# Patient Record
Sex: MALE | Race: BLACK OR AFRICAN AMERICAN | NOT HISPANIC OR LATINO | Employment: FULL TIME | URBAN - METROPOLITAN AREA
[De-identification: names, ages, dates, MRNs, and addresses within clinical notes are randomized per-mention and may not be internally consistent; named-entity substitution may affect disease eponyms.]

---

## 2022-07-18 ENCOUNTER — HOSPITAL ENCOUNTER (EMERGENCY)
Facility: HOSPITAL | Age: 42
Discharge: HOME | End: 2022-07-18
Attending: STUDENT IN AN ORGANIZED HEALTH CARE EDUCATION/TRAINING PROGRAM
Payer: COMMERCIAL

## 2022-07-18 VITALS
HEIGHT: 75 IN | RESPIRATION RATE: 16 BRPM | HEART RATE: 69 BPM | DIASTOLIC BLOOD PRESSURE: 89 MMHG | SYSTOLIC BLOOD PRESSURE: 149 MMHG | TEMPERATURE: 97 F | WEIGHT: 255.31 LBS | BODY MASS INDEX: 31.75 KG/M2 | OXYGEN SATURATION: 100 %

## 2022-07-18 DIAGNOSIS — R07.9 CHEST PAIN: ICD-10-CM

## 2022-07-18 DIAGNOSIS — R07.89 ATYPICAL CHEST PAIN: Primary | ICD-10-CM

## 2022-07-18 DIAGNOSIS — T14.8XXA MUSCLE STRAIN: ICD-10-CM

## 2022-07-18 LAB
ALBUMIN SERPL-MCNC: 4.1 GM/DL (ref 3.5–5)
ALBUMIN/GLOB SERPL: 1.2 RATIO (ref 1.1–2)
ALP SERPL-CCNC: 69 UNIT/L (ref 40–150)
ALT SERPL-CCNC: 41 UNIT/L (ref 0–55)
AST SERPL-CCNC: 31 UNIT/L (ref 5–34)
BASOPHILS # BLD AUTO: 0.03 X10(3)/MCL (ref 0–0.2)
BASOPHILS NFR BLD AUTO: 0.4 %
BILIRUBIN DIRECT+TOT PNL SERPL-MCNC: 0.4 MG/DL
BUN SERPL-MCNC: 18.2 MG/DL (ref 8.9–20.6)
CALCIUM SERPL-MCNC: 9.9 MG/DL (ref 8.4–10.2)
CHLORIDE SERPL-SCNC: 101 MMOL/L (ref 98–107)
CO2 SERPL-SCNC: 28 MMOL/L (ref 22–29)
CREAT SERPL-MCNC: 1.24 MG/DL (ref 0.73–1.18)
EOSINOPHIL # BLD AUTO: 0.17 X10(3)/MCL (ref 0–0.9)
EOSINOPHIL NFR BLD AUTO: 2.5 %
ERYTHROCYTE [DISTWIDTH] IN BLOOD BY AUTOMATED COUNT: 13.8 % (ref 11.5–17)
GLOBULIN SER-MCNC: 3.4 GM/DL (ref 2.4–3.5)
GLUCOSE SERPL-MCNC: 108 MG/DL (ref 74–100)
HCT VFR BLD AUTO: 46.3 % (ref 42–52)
HGB BLD-MCNC: 14.7 GM/DL (ref 14–18)
IMM GRANULOCYTES # BLD AUTO: 0.02 X10(3)/MCL (ref 0–0.04)
IMM GRANULOCYTES NFR BLD AUTO: 0.3 %
LYMPHOCYTES # BLD AUTO: 2.42 X10(3)/MCL (ref 0.6–4.6)
LYMPHOCYTES NFR BLD AUTO: 34.9 %
MCH RBC QN AUTO: 29.1 PG (ref 27–31)
MCHC RBC AUTO-ENTMCNC: 31.7 MG/DL (ref 33–36)
MCV RBC AUTO: 91.5 FL (ref 80–94)
MONOCYTES # BLD AUTO: 0.73 X10(3)/MCL (ref 0.1–1.3)
MONOCYTES NFR BLD AUTO: 10.5 %
NEUTROPHILS # BLD AUTO: 3.6 X10(3)/MCL (ref 2.1–9.2)
NEUTROPHILS NFR BLD AUTO: 51.4 %
NRBC BLD AUTO-RTO: 0 %
PLATELET # BLD AUTO: 244 X10(3)/MCL (ref 130–400)
PMV BLD AUTO: 9.1 FL (ref 7.4–10.4)
POTASSIUM SERPL-SCNC: 3.8 MMOL/L (ref 3.5–5.1)
PROT SERPL-MCNC: 7.5 GM/DL (ref 6.4–8.3)
RBC # BLD AUTO: 5.06 X10(6)/MCL (ref 4.7–6.1)
SODIUM SERPL-SCNC: 138 MMOL/L (ref 136–145)
TROPONIN I SERPL-MCNC: <0.01 NG/ML (ref 0–0.04)
WBC # SPEC AUTO: 6.9 X10(3)/MCL (ref 4.5–11.5)

## 2022-07-18 PROCEDURE — 84484 ASSAY OF TROPONIN QUANT: CPT | Performed by: PHYSICIAN ASSISTANT

## 2022-07-18 PROCEDURE — 99285 EMERGENCY DEPT VISIT HI MDM: CPT | Mod: 25

## 2022-07-18 PROCEDURE — 96372 THER/PROPH/DIAG INJ SC/IM: CPT | Performed by: PHYSICIAN ASSISTANT

## 2022-07-18 PROCEDURE — 80053 COMPREHEN METABOLIC PANEL: CPT | Performed by: PHYSICIAN ASSISTANT

## 2022-07-18 PROCEDURE — 36415 COLL VENOUS BLD VENIPUNCTURE: CPT | Performed by: PHYSICIAN ASSISTANT

## 2022-07-18 PROCEDURE — 85025 COMPLETE CBC W/AUTO DIFF WBC: CPT | Performed by: PHYSICIAN ASSISTANT

## 2022-07-18 PROCEDURE — 93005 ELECTROCARDIOGRAM TRACING: CPT

## 2022-07-18 PROCEDURE — 63600175 PHARM REV CODE 636 W HCPCS: Performed by: PHYSICIAN ASSISTANT

## 2022-07-18 RX ORDER — DICLOFENAC SODIUM 75 MG/1
75 TABLET, DELAYED RELEASE ORAL 2 TIMES DAILY PRN
Qty: 30 TABLET | Refills: 0 | Status: SHIPPED | OUTPATIENT
Start: 2022-07-18 | End: 2022-07-18 | Stop reason: SDUPTHER

## 2022-07-18 RX ORDER — DICLOFENAC SODIUM 75 MG/1
75 TABLET, DELAYED RELEASE ORAL 2 TIMES DAILY PRN
Qty: 30 TABLET | Refills: 0 | Status: SHIPPED | OUTPATIENT
Start: 2022-07-18

## 2022-07-18 RX ORDER — KETOROLAC TROMETHAMINE 30 MG/ML
30 INJECTION, SOLUTION INTRAMUSCULAR; INTRAVENOUS
Status: COMPLETED | OUTPATIENT
Start: 2022-07-18 | End: 2022-07-18

## 2022-07-18 RX ORDER — METHOCARBAMOL 500 MG/1
1000 TABLET, FILM COATED ORAL 3 TIMES DAILY
Qty: 30 TABLET | Refills: 0 | Status: SHIPPED | OUTPATIENT
Start: 2022-07-18 | End: 2022-07-23

## 2022-07-18 RX ORDER — METHOCARBAMOL 500 MG/1
1000 TABLET, FILM COATED ORAL 3 TIMES DAILY
Qty: 30 TABLET | Refills: 0 | Status: SHIPPED | OUTPATIENT
Start: 2022-07-18 | End: 2022-07-18 | Stop reason: SDUPTHER

## 2022-07-18 RX ADMIN — KETOROLAC TROMETHAMINE 30 MG: 30 INJECTION, SOLUTION INTRAMUSCULAR; INTRAVENOUS at 09:07

## 2022-07-19 NOTE — ED PROVIDER NOTES
Encounter Date: 7/18/2022       History     Chief Complaint   Patient presents with    Chest Pain     Pt to ed c/o chest pain radiating to left arm and back that began last night. Pt denies any cardiac history. Reports hx of anxiety but recently stopped taking the medication     Patient is a 41 year old male who presents to the ED with complaints of central chest pain radiating to left arm, back pain x 1 day that began while sleeping last night. He does endorse chest pain when pressing on chest.   He denies cardiac hx.  He does report having anxiety but he stopped taking the medication.  He denies SOB, vision changes, dizziness, abdominal pain, nausea, vomiting.      The history is provided by the patient. No  was used.     Review of patient's allergies indicates:  No Known Allergies  History reviewed. No pertinent past medical history.  No past surgical history on file.  No family history on file.  Social History     Tobacco Use    Smoking status: Current Every Day Smoker     Packs/day: 0.50     Types: Cigarettes    Smokeless tobacco: Never Used   Substance Use Topics    Alcohol use: Never    Drug use: Never     Review of Systems   Constitutional: Negative for appetite change, chills and fever.   HENT: Negative.    Respiratory: Negative for cough and shortness of breath.    Cardiovascular: Positive for chest pain. Negative for palpitations.   Gastrointestinal: Negative for abdominal pain, nausea and vomiting.   Genitourinary: Negative.    Musculoskeletal: Positive for back pain. Negative for neck pain.        Left arm pain   Skin: Negative.    Neurological: Negative for dizziness, light-headedness and headaches.   Hematological: Negative.        Physical Exam     Initial Vitals [07/18/22 1719]   BP Pulse Resp Temp SpO2   (!) 149/89 69 16 97 °F (36.1 °C) 100 %      MAP       --         Physical Exam    Nursing note and vitals reviewed.  Constitutional: He appears well-developed and  well-nourished.   HENT:   Nose: Nose normal.   Mouth/Throat: Oropharynx is clear and moist.   Eyes: Conjunctivae are normal. Pupils are equal, round, and reactive to light.   Neck:   Normal range of motion.  Cardiovascular: Normal rate, regular rhythm, normal heart sounds and intact distal pulses.   Pulmonary/Chest: Breath sounds normal. He has no wheezes.   Abdominal: Abdomen is soft. Bowel sounds are normal. There is no abdominal tenderness.   Musculoskeletal:         General: Normal range of motion.      Cervical back: Normal range of motion. No bony tenderness.      Thoracic back: No bony tenderness.      Lumbar back: No bony tenderness.     Neurological: He is alert. He has normal strength. GCS score is 15. GCS eye subscore is 4. GCS verbal subscore is 5. GCS motor subscore is 6.   Skin: Capillary refill takes less than 2 seconds.         ED Course   Procedures  Labs Reviewed   COMPREHENSIVE METABOLIC PANEL - Abnormal; Notable for the following components:       Result Value    Glucose Level 108 (*)     Creatinine 1.24 (*)     All other components within normal limits   CBC WITH DIFFERENTIAL - Abnormal; Notable for the following components:    MCHC 31.7 (*)     All other components within normal limits   TROPONIN I - Normal   CBC W/ AUTO DIFFERENTIAL    Narrative:     The following orders were created for panel order CBC Auto Differential.  Procedure                               Abnormality         Status                     ---------                               -----------         ------                     CBC with Differential[[id number]]        Abnormal            Final result                 Please view results for these tests on the individual orders.        ECG Results          EKG 12-lead (Chest Pain) Age >30 (In process)  Result time 07/18/22 18:29:08    In process by Interface, Lab In [facility] (07/18/22 18:29:08)                 Narrative:    Test Reason : R07.9,    Vent. Rate : 065 BPM     Atrial  Rate : 065 BPM     P-R Int : 160 ms          QRS Dur : 090 ms      QT Int : 410 ms       P-R-T Axes : 069 004 001 degrees     QTc Int : 426 ms    Normal sinus rhythm  Minimal voltage criteria for LVH, may be normal variant  Borderline Abnormal ECG  No previous ECGs available    Referred By: [email or web address]   SELF           Confirmed By:                             Imaging Results          X-Ray Chest PA And Lateral (Final result)  Result time 07/19/22 07:24:29    Final result by Sanjeev Costa MD (07/19/22 07:24:29)                 Impression:      No acute cardiopulmonary process.      Electronically signed by: Sanjeev Costa  Date:    07/19/2022  Time:    07:24             Narrative:    EXAMINATION:  XR CHEST PA AND LATERAL    CLINICAL HISTORY:  Chest pain, unspecified    TECHNIQUE:  Two views of the chest    COMPARISON:  04/24/2020    FINDINGS:  No focal opacification, pleural effusion, or pneumothorax.    The cardiomediastinal silhouette is within normal limits.    No acute osseous abnormality.                                 Medications   ketorolac injection 30 mg (30 mg Intramuscular Given 7/18/22 2116)     Medical Decision Making:   Clinical Tests:   Lab Tests: Ordered and Reviewed  Radiological Study: Ordered and Reviewed  Medical Tests: Ordered and Reviewed  ED Management:  The patient is resting comfortably and in no acute distress.  He states that his symptoms have improved after treatment in Emergency Department. I personally discussed his test results and treatment plan.  Gave strict ED precautions and specific conditions for return to the emergency department and importance of follow up with pcp.  Patient voices understanding and agrees to the plan discussed. All patients' questions have been answered at this time. He has remained hemodynamically stable throughout entire stay in ED and is stable for discharge home.             ED Course as of 07/19/22 1514   Tue Jul 19, 2022   1509 X-Ray Chest PA And  Lateral  No acute cardiopulmonary process. [ER]      ED Course User Index  [ER] ALEXA Carrillo             Clinical Impression:   Final diagnoses:  [R07.9] Chest pain  [R07.89] Atypical chest pain (Primary)  [T14.8XXA] Muscle strain          ED Disposition Condition    Discharge Stable        ED Prescriptions     Medication Sig Dispense Start Date End Date Auth. Provider    diclofenac (VOLTAREN) 75 MG EC tablet  (Status: Discontinued) Take 1 tablet (75 mg total) by mouth 2 (two) times daily as needed (pain). Do not take this with Advil, Ibuprofen, Motrin, Asprin, or Toradol. 30 tablet 7/18/2022 7/18/2022 ALEXA Carrillo    methocarbamoL (ROBAXIN) 500 MG Tab  (Status: Discontinued) Take 2 tablets (1,000 mg total) by mouth 3 (three) times daily. for 5 days 30 tablet 7/18/2022 7/18/2022 ALEXA Carrillo    diclofenac (VOLTAREN) 75 MG EC tablet  (Status: Discontinued) Take 1 tablet (75 mg total) by mouth 2 (two) times daily as needed (pain). Do not take this with Advil, Ibuprofen, Motrin, Asprin, or Toradol. 30 tablet 7/18/2022 7/18/2022 ALEXA Carrillo    methocarbamoL (ROBAXIN) 500 MG Tab Take 2 tablets (1,000 mg total) by mouth 3 (three) times daily. for 5 days 30 tablet 7/18/2022 7/23/2022 ALEXA Carrillo    diclofenac (VOLTAREN) 75 MG EC tablet Take 1 tablet (75 mg total) by mouth 2 (two) times daily as needed (pain). Do not take this with Advil, Ibuprofen, Motrin, Asprin, or Toradol. 30 tablet 7/18/2022  ALEXA Carrillo        Follow-up Information     Follow up With Specialties Details Why Contact Info Additional Information    [facility] - Emergency Dept Emergency Medicine  As needed, If symptoms worsen [address] [place] [id number] [phone]     [facility] - Internal Medicine Internal Medicine Call  Ask For Internal Medicine clinic.  Once connected to clinic, inform them you were referred to clinic to establish care with a pcp. [address] [place] [address]  [place] [address]  [phone] Internal Medicine Clinic Entrance #1           ALEXA Carrillo  07/19/22 2324

## 2024-07-10 ENCOUNTER — HOSPITAL ENCOUNTER (INPATIENT)
Facility: HOSPITAL | Age: 44
LOS: 6 days | Discharge: HOME | DRG: 310 | End: 2024-07-16
Attending: EMERGENCY MEDICINE | Admitting: INTERNAL MEDICINE

## 2024-07-10 DIAGNOSIS — I48.91 ATRIAL FIBRILLATION WITH RVR: ICD-10-CM

## 2024-07-10 DIAGNOSIS — I48.91 ATRIAL FIBRILLATION: ICD-10-CM

## 2024-07-10 DIAGNOSIS — R07.9 CHEST PAIN: ICD-10-CM

## 2024-07-10 DIAGNOSIS — I48.91 NEW ONSET ATRIAL FIBRILLATION: Primary | ICD-10-CM

## 2024-07-10 PROBLEM — F17.200 TOBACCO DEPENDENCY: Status: ACTIVE | Noted: 2024-07-10

## 2024-07-10 PROBLEM — E66.9 OBESITY: Status: ACTIVE | Noted: 2024-07-10

## 2024-07-10 LAB
ALBUMIN SERPL-MCNC: 3.8 G/DL (ref 3.5–5)
ALBUMIN/GLOB SERPL: 1.2 RATIO (ref 1.1–2)
ALP SERPL-CCNC: 61 UNIT/L (ref 40–150)
ALT SERPL-CCNC: 51 UNIT/L (ref 0–55)
ANION GAP SERPL CALC-SCNC: 6 MEQ/L
AST SERPL-CCNC: 26 UNIT/L (ref 5–34)
BASOPHILS # BLD AUTO: 0.03 X10(3)/MCL
BASOPHILS NFR BLD AUTO: 0.5 %
BILIRUB SERPL-MCNC: 0.5 MG/DL
BNP BLD-MCNC: 323.6 PG/ML
BUN SERPL-MCNC: 10.8 MG/DL (ref 8.9–20.6)
CALCIUM SERPL-MCNC: 9.9 MG/DL (ref 8.4–10.2)
CHLORIDE SERPL-SCNC: 107 MMOL/L (ref 98–107)
CO2 SERPL-SCNC: 26 MMOL/L (ref 22–29)
CREAT SERPL-MCNC: 1.11 MG/DL (ref 0.73–1.18)
CREAT/UREA NIT SERPL: 10
EOSINOPHIL # BLD AUTO: 0.09 X10(3)/MCL (ref 0–0.9)
EOSINOPHIL NFR BLD AUTO: 1.4 %
ERYTHROCYTE [DISTWIDTH] IN BLOOD BY AUTOMATED COUNT: 14.1 % (ref 11.5–17)
GFR SERPLBLD CREATININE-BSD FMLA CKD-EPI: >60 ML/MIN/1.73/M2
GLOBULIN SER-MCNC: 3.3 GM/DL (ref 2.4–3.5)
GLUCOSE SERPL-MCNC: 110 MG/DL (ref 74–100)
HCT VFR BLD AUTO: 48.5 % (ref 42–52)
HGB BLD-MCNC: 15.9 G/DL (ref 14–18)
HOLD SPECIMEN: NORMAL
HOLD SPECIMEN: NORMAL
IMM GRANULOCYTES # BLD AUTO: 0.03 X10(3)/MCL (ref 0–0.04)
IMM GRANULOCYTES NFR BLD AUTO: 0.5 %
LYMPHOCYTES # BLD AUTO: 2.22 X10(3)/MCL (ref 0.6–4.6)
LYMPHOCYTES NFR BLD AUTO: 35.2 %
MCH RBC QN AUTO: 30.6 PG (ref 27–31)
MCHC RBC AUTO-ENTMCNC: 32.8 G/DL (ref 33–36)
MCV RBC AUTO: 93.4 FL (ref 80–94)
MONOCYTES # BLD AUTO: 0.77 X10(3)/MCL (ref 0.1–1.3)
MONOCYTES NFR BLD AUTO: 12.2 %
NEUTROPHILS # BLD AUTO: 3.17 X10(3)/MCL (ref 2.1–9.2)
NEUTROPHILS NFR BLD AUTO: 50.2 %
NRBC BLD AUTO-RTO: 0 %
OHS QRS DURATION: 76 MS
OHS QRS DURATION: 86 MS
OHS QTC CALCULATION: 472 MS
OHS QTC CALCULATION: 478 MS
PLATELET # BLD AUTO: 278 X10(3)/MCL (ref 130–400)
PMV BLD AUTO: 9.2 FL (ref 7.4–10.4)
POTASSIUM SERPL-SCNC: 4.4 MMOL/L (ref 3.5–5.1)
PROT SERPL-MCNC: 7.1 GM/DL (ref 6.4–8.3)
RBC # BLD AUTO: 5.19 X10(6)/MCL (ref 4.7–6.1)
SODIUM SERPL-SCNC: 139 MMOL/L (ref 136–145)
TROPONIN I SERPL-MCNC: <0.01 NG/ML (ref 0–0.04)
TSH SERPL-ACNC: 1.23 UIU/ML (ref 0.35–4.94)
WBC # BLD AUTO: 6.31 X10(3)/MCL (ref 4.5–11.5)

## 2024-07-10 PROCEDURE — 93005 ELECTROCARDIOGRAM TRACING: CPT

## 2024-07-10 PROCEDURE — 99285 EMERGENCY DEPT VISIT HI MDM: CPT | Mod: 25

## 2024-07-10 PROCEDURE — 85025 COMPLETE CBC W/AUTO DIFF WBC: CPT | Performed by: PHYSICIAN ASSISTANT

## 2024-07-10 PROCEDURE — 25000003 PHARM REV CODE 250

## 2024-07-10 PROCEDURE — 83880 ASSAY OF NATRIURETIC PEPTIDE: CPT | Performed by: PHYSICIAN ASSISTANT

## 2024-07-10 PROCEDURE — 25000003 PHARM REV CODE 250: Performed by: PHYSICIAN ASSISTANT

## 2024-07-10 PROCEDURE — 80053 COMPREHEN METABOLIC PANEL: CPT | Performed by: PHYSICIAN ASSISTANT

## 2024-07-10 PROCEDURE — 63600175 PHARM REV CODE 636 W HCPCS

## 2024-07-10 PROCEDURE — 84443 ASSAY THYROID STIM HORMONE: CPT | Performed by: PHYSICIAN ASSISTANT

## 2024-07-10 PROCEDURE — 21400001 HC TELEMETRY ROOM

## 2024-07-10 PROCEDURE — 84484 ASSAY OF TROPONIN QUANT: CPT | Performed by: PHYSICIAN ASSISTANT

## 2024-07-10 PROCEDURE — 96374 THER/PROPH/DIAG INJ IV PUSH: CPT

## 2024-07-10 RX ORDER — SODIUM CHLORIDE 9 MG/ML
INJECTION, SOLUTION INTRAVENOUS ONCE
Status: COMPLETED | OUTPATIENT
Start: 2024-07-10 | End: 2024-07-10

## 2024-07-10 RX ORDER — NALOXONE HCL 0.4 MG/ML
0.02 VIAL (ML) INJECTION
Status: DISCONTINUED | OUTPATIENT
Start: 2024-07-10 | End: 2024-07-16 | Stop reason: HOSPADM

## 2024-07-10 RX ORDER — METOPROLOL TARTRATE 1 MG/ML
5 INJECTION, SOLUTION INTRAVENOUS
Status: COMPLETED | OUTPATIENT
Start: 2024-07-10 | End: 2024-07-10

## 2024-07-10 RX ORDER — GLUCAGON 1 MG
1 KIT INJECTION
Status: DISCONTINUED | OUTPATIENT
Start: 2024-07-10 | End: 2024-07-16 | Stop reason: HOSPADM

## 2024-07-10 RX ORDER — IBUPROFEN 200 MG
16 TABLET ORAL
Status: DISCONTINUED | OUTPATIENT
Start: 2024-07-10 | End: 2024-07-16 | Stop reason: HOSPADM

## 2024-07-10 RX ORDER — DILTIAZEM HYDROCHLORIDE 5 MG/ML
10 INJECTION INTRAVENOUS
Status: COMPLETED | OUTPATIENT
Start: 2024-07-10 | End: 2024-07-10

## 2024-07-10 RX ORDER — METOPROLOL TARTRATE 1 MG/ML
5 INJECTION, SOLUTION INTRAVENOUS EVERY 5 MIN PRN
Status: COMPLETED | OUTPATIENT
Start: 2024-07-10 | End: 2024-07-10

## 2024-07-10 RX ORDER — IBUPROFEN 200 MG
1 TABLET ORAL DAILY PRN
Status: DISCONTINUED | OUTPATIENT
Start: 2024-07-10 | End: 2024-07-16 | Stop reason: HOSPADM

## 2024-07-10 RX ORDER — SODIUM CHLORIDE 0.9 % (FLUSH) 0.9 %
10 SYRINGE (ML) INJECTION EVERY 12 HOURS PRN
Status: DISCONTINUED | OUTPATIENT
Start: 2024-07-10 | End: 2024-07-16 | Stop reason: HOSPADM

## 2024-07-10 RX ORDER — DILTIAZEM HYDROCHLORIDE 5 MG/ML
20 INJECTION INTRAVENOUS
Status: COMPLETED | OUTPATIENT
Start: 2024-07-10 | End: 2024-07-10

## 2024-07-10 RX ORDER — HEPARIN SODIUM 5000 [USP'U]/ML
5000 INJECTION, SOLUTION INTRAVENOUS; SUBCUTANEOUS EVERY 8 HOURS
Status: DISCONTINUED | OUTPATIENT
Start: 2024-07-10 | End: 2024-07-11

## 2024-07-10 RX ORDER — IBUPROFEN 200 MG
24 TABLET ORAL
Status: DISCONTINUED | OUTPATIENT
Start: 2024-07-10 | End: 2024-07-16 | Stop reason: HOSPADM

## 2024-07-10 RX ADMIN — DILTIAZEM HYDROCHLORIDE 20 MG: 5 INJECTION INTRAVENOUS at 11:07

## 2024-07-10 RX ADMIN — METOPROLOL TARTRATE 5 MG: 1 INJECTION, SOLUTION INTRAVENOUS at 03:07

## 2024-07-10 RX ADMIN — METOPROLOL TARTRATE 5 MG: 1 INJECTION, SOLUTION INTRAVENOUS at 08:07

## 2024-07-10 RX ADMIN — DILTIAZEM HYDROCHLORIDE 10 MG: 5 INJECTION INTRAVENOUS at 11:07

## 2024-07-10 RX ADMIN — SODIUM CHLORIDE: 9 INJECTION, SOLUTION INTRAVENOUS at 10:07

## 2024-07-10 RX ADMIN — METOPROLOL TARTRATE 5 MG: 1 INJECTION, SOLUTION INTRAVENOUS at 07:07

## 2024-07-10 RX ADMIN — HEPARIN SODIUM 5000 UNITS: 5000 INJECTION, SOLUTION INTRAVENOUS; SUBCUTANEOUS at 08:07

## 2024-07-10 NOTE — LETTER
July 16, 2024         [address] [facility] [address]  Phone: [phone]  Fax: [phone]       Patient: Thad Payne   YOB: 1980  Date of Visit: 07/16/2024    To Whom It May Concern:    Bryant Payne  was at [facility] on 7/10/2024. The patient may return to work/school on 7/22/2024 with no restrictions. If you have any questions or concerns, or if I can be of further assistance, please do not hesitate to contact me.

## 2024-07-10 NOTE — H&P
"[facility] Wards History & Physical Note     Resident Team: [facility] Medicine List 2  Attending Physician: Venkat Huertas MD  Resident: Chico Lee MD    Date of Admit: 7/10/2024    Chief Complaint     Chest Pain (C/o "heart racing the last 2 days" with intermittent midsternal chest pain, denies SOB)       Subjective:      History of Present Illness:  Thad Payne Jr. is a 43 y.o.  male with a no significant PMHx who presented on 7/10/2024  with c/o palpitations onset 7/7/24.  Palpitations occurred at rest and with movement.  It was worse with with movement and activity.  He denies taking any medicines have any medication changes.  He does report using some male enhancement medications approximately 2 weeks ago.  Drinks a proximally 2-4 beverages per day.  Patient endorses family and financial stress at home.  He reports occasional lightheadedness, but no headaches or recent illnesses.  He denies any chest pain, but states that he does feel palpitations throughout the day.  No nausea vomiting or diarrhea.  No sick contacts or  Shortness for breath.    In the ED his initial pulse was 83, /83, T-max 98.2 degrees Fahrenheit, satting 100 percent on room air.  While in the ED, he did convert atrial fibrillation with a rapid ventricular response, confirmed with EKGs.  Rate was found to be between 130 and 180 beats per minute.  He was given a 10 milligram and 20 milligram push of diltiazem.  Rate he turned to normal briefly, but increased again.  He received 1 dose of metoprolol in the ED. CBC and CMP were both unremarkable.  BNP was 323 and troponin was negative.  TSH was also than normal limits.      Past Medical History:  History reviewed. No pertinent past medical history.    Past Surgical History:  History reviewed. No pertinent surgical history.    Family History:  Mother has tachycardia, managed with metoprolol    Social History:  Social History     Tobacco Use    Smoking status: Every Day     Current " "packs/day: 0.50     Types: Cigarettes    Smokeless tobacco: Never   Substance Use Topics    Alcohol use: Never    Drug use: Never       Allergies:  Review of patient's allergies indicates:  No Known Allergies    Home Medications:  Prior to Admission medications    Medication Sig Start Date End Date Taking? Authorizing Provider   diclofenac (VOLTAREN) 75 MG EC tablet Take 1 tablet (75 mg total) by mouth 2 (two) times daily as needed (pain). Do not take this with Advil, Ibuprofen, Motrin, Asprin, or Toradol. 22   Imelda Garcia PA         Review of Systems:  Review of Systems   Constitutional:  Negative for chills and fever.   Respiratory:  Negative for cough, shortness of breath and wheezing.    Cardiovascular:  Positive for chest pain and palpitations.   Gastrointestinal:  Negative for abdominal pain, diarrhea, nausea and vomiting.   Genitourinary:  Negative for dysuria.   Neurological:  Positive for dizziness. Negative for tingling, weakness and headaches.             Objective:   Last 24 Hour Vital Signs:  BP  Min: 101/73  Max: 131/83  Temp  Av.2 °F (36.8 °C)  Min: 98.2 °F (36.8 °C)  Max: 98.2 °F (36.8 °C)  Pulse  Av.6  Min: 35  Max: 166  Resp  Av.7  Min: 15  Max: 24  SpO2  Av %  Min: 100 %  Max: 100 %  Height  Av' 3" (190.5 cm)  Min: 6' 3" (190.5 cm)  Max: 6' 3" (190.5 cm)  Weight  Av.1 kg (251 lb 10.5 oz)  Min: 114.1 kg (251 lb 10.5 oz)  Max: 114.1 kg (251 lb 10.5 oz)  Body mass index is 31.45 kg/m².  No intake/output data recorded.    Physical Examination:  Physical Exam  Constitutional:       General: He is not in acute distress.     Appearance: He is not ill-appearing.   Cardiovascular:      Rate and Rhythm: Tachycardia present. Rhythm irregular.      Heart sounds: No murmur heard.     Comments: Heart monitor showing rates ranging from 120s to 180s  Pulmonary:      Effort: No respiratory distress.      Breath sounds: Normal breath sounds. No wheezing.   Abdominal:      " "Palpations: Abdomen is soft.   Skin:     General: Skin is warm and dry.      Capillary Refill: Capillary refill takes less than 2 seconds.   Neurological:      Mental Status: Mental status is at baseline.      Motor: No weakness.   Psychiatric:         Mood and Affect: Mood normal.          Laboratory:  Most Recent Data:  CBC:   Lab Results   Component Value Date    WBC 6.31 07/10/2024    HGB 15.9 07/10/2024    HCT 48.5 07/10/2024     07/10/2024    MCV 93.4 07/10/2024    RDW 14.1 07/10/2024     WBC Differential:   Recent Labs   Lab 07/10/24  1123   WBC 6.31   HGB 15.9   HCT 48.5      MCV 93.4     BMP:   Lab Results   Component Value Date     07/10/2024    K 4.4 07/10/2024     07/10/2024    CO2 26 07/10/2024    BUN 10.8 07/10/2024    CREATININE 1.11 07/10/2024    CALCIUM 9.9 07/10/2024     LFTs:   Lab Results   Component Value Date    ALBUMIN 3.8 07/10/2024    BILITOT 0.5 07/10/2024    AST 26 07/10/2024    ALKPHOS 61 07/10/2024    ALT 51 07/10/2024     Coags: No results found for: "INR", "PROTIME", "PTT"  FLP: No results found for: "CHOL", "HDL", "LDLCALC", "TRIG", "CHOLHDL"  DM:   Lab Results   Component Value Date    CREATININE 1.11 07/10/2024     Thyroid:   Lab Results   Component Value Date    TSH 1.230 07/10/2024      Anemia: No results found for: "IRON", "TIBC", "FERRITIN", "SATURATEDIRO"  No results found for: "[id number]"  No results found for: "FOLATE"     Cardiac:   Lab Results   Component Value Date    TROPONINI <0.010 07/10/2024    .6 (H) 07/10/2024     Urinalysis: No results found for: "LABURIN", "COLORU", "PHUA", "CLARITYU", "SPECGRAV", "LABSPEC", "NITRITE", "PROTEINUR", "GLUCOSEU", "KETONESU", "UROBILINOGEN", "BILIRUBINUR", "BLOODU", "RBCU", "WBCUA"    Trended Lab Data:  Recent Labs   Lab 07/10/24  1123   WBC 6.31   HGB 15.9   HCT 48.5      MCV 93.4   RDW 14.1      K 4.4      CO2 26   BUN 10.8   CREATININE 1.11   ALBUMIN 3.8   BILITOT 0.5   AST 26 "   ALKPHOS 61   ALT 51       Trended Cardiac Data:  Recent Labs   Lab 07/10/24  1123   TROPONINI <0.010   .6*       Microbiology Data:  Microbiology Results (last 7 days)       ** No results found for the last 168 hours. **             Other Results:  EKG:   Results for orders placed or performed during the hospital encounter of 07/10/24   EKG 12-lead    Collection Time: 07/10/24 12:36 PM   Result Value Ref Range    QRS Duration 86 ms    [facility] QTC Calculation 472 ms    Narrative    Test Reason : R07.9,    Vent. Rate : 116 BPM     Atrial Rate : 163 BPM     P-R Int : 000 ms          QRS Dur : 086 ms      QT Int : 340 ms       P-R-T Axes : 000 002 050 degrees     QTc Int : 472 ms    Atrial fibrillation with rapid ventricular response  Abnormal ECG  When compared with ECG of 10-JUL-2024 10:32,  No significant change was found    Referred By: AAAREFERR   SELF           Confirmed By:        Radiology:  Imaging Results              X-Ray Chest 1 View (Final result)  Result time 07/10/24 11:09:18      Final result by Rich Villafuerte MD (07/10/24 11:09:18)                   Impression:      No acute pulmonary process identified.      Electronically signed by: Rich Villafuerte  Date:    07/10/2024  Time:    11:09               Narrative:    EXAMINATION:  XR CHEST 1 VIEW    CLINICAL HISTORY:  chest pain;    TECHNIQUE:  Frontal view(s) of the chest.    COMPARISON:  Radiography 07/18/2022    FINDINGS:  Normal cardiac silhouette.  The lungs are well-inflated.  No consolidation identified.  No significant pleural effusion or discernible pneumothorax.                                         Assessment & Plan:     AFib with RVR  -admitted to telemetry  -etiology new be related to stress;  - low suspicion of medication induced, as the male enhancement medication was taken over 2 weeks ago.  - RVR incompletely responsive to nifedipine 10 milligrams 20 milligram pushes  -gave 1 dose of 5 milligrams of metoprolol in the ED  - 2 more  doses, 5 minutes apart p.r.n., available for metoprolol IV  - can consider adding digoxin rate continues to be uncontrolled  - amiodarone drip not completely ruled out  -patient aware he may be upgraded to the ICU if a diltiazem or amiodarone drip as required.  - TSH and troponin both within normal limits  - Consider cardiology consult in the AM    Nicotine abuse-  Was less than half pack per day   With have nicotine patches available for the patient as needed    CODE STATUS: Full Code  Diet: Heart Healthy   DVT Prophylaxis:   Anticoagulants   Medication Route Frequency    heparin (porcine) injection 5,000 Units Subcutaneous Q8H       Chico Lee MD  \[facility]\"" Family Medicine HO-2

## 2024-07-10 NOTE — ED PROVIDER NOTES
"Encounter Date: 7/10/2024    I, Bucky Mello MD, did conduct a face to face encounter with this patient initially seen by our advanced practice provider. I did perform a history and physical, did direct the evaluation and management, and do agree with the care as documented.         History     Chief Complaint   Patient presents with    Chest Pain     C/o "heart racing the last 2 days" with intermittent midsternal chest pain, denies SOB     Thad Payne Jr. is a 43 y.o. male who presents to the ED with complaints of "feel like my heart has been racing" off and on x 2 days. He reports intermittent chest pain and shortness of breath when he feels his heart racing. Denies any past medical history but states his mom is on Metoprolol for tachycardia.      The history is provided by the patient. No  was used.     Review of patient's allergies indicates:  No Known Allergies  History reviewed. No pertinent past medical history.  History reviewed. No pertinent surgical history.  No family history on file.  Social History     Tobacco Use    Smoking status: Every Day     Current packs/day: 0.50     Types: Cigarettes    Smokeless tobacco: Never   Substance Use Topics    Alcohol use: Never    Drug use: Never     Review of Systems   Constitutional:  Negative for chills, fatigue and fever.   HENT:  Negative for congestion, ear pain, sinus pain and sore throat.    Eyes:  Negative for pain.   Respiratory:  Positive for shortness of breath. Negative for cough and chest tightness.    Cardiovascular:  Positive for chest pain and palpitations. Negative for leg swelling.   Gastrointestinal:  Negative for abdominal pain, constipation, diarrhea, nausea and vomiting.   Genitourinary:  Negative for dysuria.   Musculoskeletal:  Negative for back pain and joint swelling.   Skin:  Negative for color change and rash.   Neurological:  Negative for dizziness and weakness.   Psychiatric/Behavioral:  Negative for " behavioral problems and confusion.        Physical Exam     Initial Vitals [07/10/24 1031]   BP Pulse Resp Temp SpO2   131/83 83 16 98.2 °F (36.8 °C) 100 %      MAP       --         Physical Exam    Nursing note and vitals reviewed.  Constitutional: He appears well-developed and well-nourished.   HENT:   Head: Normocephalic and atraumatic.   Right Ear: External ear normal.   Left Ear: External ear normal.   Nose: Nose normal.   Eyes: Conjunctivae and EOM are normal.   Neck: Neck supple. No thyromegaly present. No tracheal deviation present.   Cardiovascular:  Normal heart sounds. An irregularly irregular rhythm present.   Tachycardia present.   Exam reveals no gallop and no friction rub.       No murmur heard.  Pulmonary/Chest: Breath sounds normal. No respiratory distress. He has no wheezes. He has no rhonchi. He has no rales. He exhibits no tenderness.   Abdominal: Abdomen is soft. He exhibits no distension.   Musculoskeletal:      Cervical back: Neck supple.     Neurological: He is alert and oriented to person, place, and time. GCS score is 15. GCS eye subscore is 4. GCS verbal subscore is 5. GCS motor subscore is 6.   Skin: Skin is warm. Capillary refill takes less than 2 seconds. No rash and no abscess noted. No erythema. No pallor.   Psychiatric: He has a normal mood and affect.         ED Course   Procedures  Labs Reviewed   COMPREHENSIVE METABOLIC PANEL - Abnormal; Notable for the following components:       Result Value    Glucose 110 (*)     All other components within normal limits   B-TYPE NATRIURETIC PEPTIDE - Abnormal; Notable for the following components:    Natriuretic Peptide 323.6 (*)     All other components within normal limits   CBC WITH DIFFERENTIAL - Abnormal; Notable for the following components:    MCHC 32.8 (*)     All other components within normal limits   TROPONIN I - Normal   TSH - Normal   CBC W/ AUTO DIFFERENTIAL    Narrative:     The following orders were created for panel order CBC  auto differential.  Procedure                               Abnormality         Status                     ---------                               -----------         ------                     CBC with Differential[[id number]]       Abnormal            Final result                 Please view results for these tests on the individual orders.   EXTRA TUBES    Narrative:     The following orders were created for panel order EXTRA TUBES.  Procedure                               Abnormality         Status                     ---------                               -----------         ------                     Light Blue Top Hold[[id number]]                             Final result               Red Top Hold[[id number]]                                    Final result                 Please view results for these tests on the individual orders.   LIGHT BLUE TOP HOLD   RED TOP HOLD     EKG Readings: (Independently Interpreted)   Initial Reading: No STEMI. Rhythm: Atrial Fibrillation. Heart Rate: 155. Clinical Impression: Atrial Fibrillation     ECG Results              EKG 12-lead (Final result)        Collection Time Result Time QRS Duration [facility] QTC Calculation    07/10/24 12:36:12 07/10/24 18:24:15 86 472                     Final result by Interface, Lab In [facility] (07/10/24 18:24:24)                   Narrative:    Test Reason : R07.9,    Vent. Rate : 116 BPM     Atrial Rate : 163 BPM     P-R Int : 000 ms          QRS Dur : 086 ms      QT Int : 340 ms       P-R-T Axes : 000 002 050 degrees     QTc Int : 472 ms    Atrial fibrillation with rapid ventricular response  Abnormal ECG  When compared with ECG of 10-JUL-2024 10:32,  No significant change was found  Confirmed by Alvaro Padilla MD ([id number]) on 7/10/2024 6:24:09 PM    Referred By: [email or web address]   SELF           Confirmed By:Alvaro Padilla MD                                     EKG 12-lead (Chest Pain) Age >30 (Final result)        Collection Time Result Time QRS  Duration [facility] QTC Calculation    07/10/24 10:32:11 07/10/24 18:22:57 76 478                     Final result by Interface, Lab In [facility] (07/10/24 18:23:01)                   Narrative:    Test Reason : R07.9,    Vent. Rate : 155 BPM     Atrial Rate : 166 BPM     P-R Int : 000 ms          QRS Dur : 076 ms      QT Int : 298 ms       P-R-T Axes : 000 016 040 degrees     QTc Int : 478 ms    Atrial fibrillation with rapid ventricular response  Abnormal ECG  When compared with ECG of 18-JUL-2022 17:29,  Atrial fibrillation has replaced Sinus rhythm  Vent. rate has increased BY  90 BPM    Confirmed by Alvaro Padilla MD ([id number]) on 7/10/2024 6:22:51 PM    Referred By:             Confirmed By:Alvaro Padilla MD                                  Imaging Results              X-Ray Chest 1 View (Final result)  Result time 07/10/24 11:09:18      Final result by Rich Villafuerte MD (07/10/24 11:09:18)                   Impression:      No acute pulmonary process identified.      Electronically signed by: Rcih Villafuerte  Date:    07/10/2024  Time:    11:09               Narrative:    EXAMINATION:  XR CHEST 1 VIEW    CLINICAL HISTORY:  chest pain;    TECHNIQUE:  Frontal view(s) of the chest.    COMPARISON:  Radiography 07/18/2022    FINDINGS:  Normal cardiac silhouette.  The lungs are well-inflated.  No consolidation identified.  No significant pleural effusion or discernible pneumothorax.                                       Medications   sodium chloride 0.9% flush 10 mL (has no administration in time range)   naloxone 0.4 mg/mL injection 0.02 mg (has no administration in time range)   glucose chewable tablet 16 g (has no administration in time range)   glucose chewable tablet 24 g (has no administration in time range)   glucagon (human recombinant) injection 1 mg (has no administration in time range)   dextrose 10% bolus 125 mL 125 mL (has no administration in time range)   dextrose 10% bolus 250 mL 250 mL (has no administration  "in time range)   nicotine 14 mg/24 hr 1 patch (has no administration in time range)   amiodarone 360 mg/200 mL (1.8 mg/mL) infusion (0 mg/min Intravenous Stopped 7/11/24 0819)   amiodarone 360 mg/200 mL (1.8 mg/mL) infusion (0.5 mg/min Intravenous New Bag 7/12/24 0714)   enoxaparin injection 40 mg (40 mg Subcutaneous Given 7/11/24 1640)   mupirocin 2 % ointment ( Nasal Given 7/11/24 2005)   metoprolol succinate (TOPROL-XL) 24 hr tablet 25 mg (25 mg Oral Given 7/11/24 2005)   diltiaZEM injection 10 mg (10 mg Intravenous Given 7/10/24 1136)   diltiaZEM injection 20 mg (20 mg Intravenous Given 7/10/24 1145)   metoprolol injection 5 mg (5 mg Intravenous Given 7/10/24 1500)   metoprolol injection 5 mg (5 mg Intravenous Given 7/10/24 2012)   0.9%  NaCl infusion ( Intravenous New Bag 7/10/24 2247)   amiodarone in dextrose 150 mg/100 mL (1.5 mg/mL) loading dose 150 mg (0 mg Intravenous Stopped 7/11/24 0212)   heparin 25,000 units in dextrose 5% (100 units/ml) IV bolus from bag LOW INTENSITY nomogram - LAF (5,780 Units Intravenous Bolus from Bag 7/11/24 0239)     Medical Decision Making  DDX: panic attack, a fib, thyroid disorder, ACS, among others    Thad Payne Jr. is a 43 y.o. male who presents to the ED with complaints of "feel like my heart has been racing" off and on x 2 days. He reports intermittent chest pain and shortness of breath when he feels his heart racing. Denies any past medical history but states his mom is on Metoprolol for tachycardia.    Hospital Course: Once patient was roomed, EKG obtained which showed a fib rvr with a rate of 155. Labs obtained. Diltiazem 10 mg IV given. After 10 minutes, patient still in a fib. Diltiazem 20 mg IV given. Fifteen minutes later, patient still in a fib. Blood pressure 113/80, patient comfortable. Rate more controlled, 80s-90s. CBC, CMP, TSH, Troponin wnl. BP elevated at 323. Discussed case with Dr. Mello who recommends admission. Will consult IM at this time. " Patient informed.     Amount and/or Complexity of Data Reviewed  Labs: ordered.  Radiology: ordered.  Discussion of management or test interpretation with external provider(s): Discussed case with Dr. Rodriguez () who will come down to the ED for evaluation and admission.     Risk  Prescription drug management.  Decision regarding hospitalization.                                      Clinical Impression:  Final diagnoses:  [R07.9] Chest pain  [I48.91] Atrial fibrillation  [I48.91] New onset atrial fibrillation (Primary)          ED Disposition Condition    Admit Stable                Sondra Gardner PA-C  07/10/24 1307       Bucky Mello MD  07/12/24 0773

## 2024-07-11 LAB
ALBUMIN SERPL-MCNC: 3.4 G/DL (ref 3.5–5)
ALBUMIN/GLOB SERPL: 1.2 RATIO (ref 1.1–2)
ALP SERPL-CCNC: 57 UNIT/L (ref 40–150)
ALT SERPL-CCNC: 55 UNIT/L (ref 0–55)
ANION GAP SERPL CALC-SCNC: 8 MEQ/L
APTT PPP: 30.5 SECONDS (ref 23.2–33.7)
AST SERPL-CCNC: 32 UNIT/L (ref 5–34)
AV INDEX (PROSTH): 0.63
AV MEAN GRADIENT: 3 MMHG
AV PEAK GRADIENT: 5 MMHG
AV VALVE AREA BY VELOCITY RATIO: 1.92 CM²
AV VALVE AREA: 1.99 CM²
AV VELOCITY RATIO: 0.61
BASOPHILS # BLD AUTO: 0.03 X10(3)/MCL
BASOPHILS NFR BLD AUTO: 0.5 %
BILIRUB SERPL-MCNC: 0.5 MG/DL
BSA FOR ECHO PROCEDURE: 2.34 M2
BUN SERPL-MCNC: 10.1 MG/DL (ref 8.9–20.6)
CALCIUM SERPL-MCNC: 9.2 MG/DL (ref 8.4–10.2)
CHLORIDE SERPL-SCNC: 109 MMOL/L (ref 98–107)
CHOLEST SERPL-MCNC: 163 MG/DL
CHOLEST/HDLC SERPL: 5 {RATIO} (ref 0–5)
CO2 SERPL-SCNC: 22 MMOL/L (ref 22–29)
CREAT SERPL-MCNC: 0.98 MG/DL (ref 0.73–1.18)
CREAT/UREA NIT SERPL: 10
CV ECHO LV RWT: 0.28 CM
DOP CALC AO PEAK VEL: 1.14 M/S
DOP CALC AO VTI: 21 CM
DOP CALC LVOT AREA: 3.2 CM2
DOP CALC LVOT DIAMETER: 2.01 CM
DOP CALC LVOT PEAK VEL: 0.69 M/S
DOP CALC LVOT STROKE VOLUME: 41.86 CM3
DOP CALC MV VTI: 20.8 CM
DOP CALCLVOT PEAK VEL VTI: 13.2 CM
E WAVE DECELERATION TIME: 166.33 MSEC
E/E' RATIO: 6.35 M/S
ECHO LV POSTERIOR WALL: 0.76 CM (ref 0.6–1.1)
EOSINOPHIL # BLD AUTO: 0.15 X10(3)/MCL (ref 0–0.9)
EOSINOPHIL NFR BLD AUTO: 2.7 %
ERYTHROCYTE [DISTWIDTH] IN BLOOD BY AUTOMATED COUNT: 14 % (ref 11.5–17)
FRACTIONAL SHORTENING: 19 % (ref 28–44)
GFR SERPLBLD CREATININE-BSD FMLA CKD-EPI: >60 ML/MIN/1.73/M2
GLOBULIN SER-MCNC: 2.9 GM/DL (ref 2.4–3.5)
GLUCOSE SERPL-MCNC: 123 MG/DL (ref 74–100)
HCT VFR BLD AUTO: 44.2 % (ref 42–52)
HDLC SERPL-MCNC: 34 MG/DL (ref 35–60)
HGB BLD-MCNC: 14.8 G/DL (ref 14–18)
HOLD SPECIMEN: NORMAL
IMM GRANULOCYTES # BLD AUTO: 0.02 X10(3)/MCL (ref 0–0.04)
IMM GRANULOCYTES NFR BLD AUTO: 0.4 %
INR PPP: 1
INTERVENTRICULAR SEPTUM: 1.06 CM (ref 0.6–1.1)
LDLC SERPL CALC-MCNC: 99 MG/DL (ref 50–140)
LEFT ATRIUM SIZE: 4.15 CM
LEFT INTERNAL DIMENSION IN SYSTOLE: 4.45 CM (ref 2.1–4)
LEFT VENTRICLE DIASTOLIC VOLUME INDEX: 63.61 ML/M2
LEFT VENTRICLE DIASTOLIC VOLUME: 147.57 ML
LEFT VENTRICLE MASS INDEX: 81 G/M2
LEFT VENTRICLE SYSTOLIC VOLUME INDEX: 38.8 ML/M2
LEFT VENTRICLE SYSTOLIC VOLUME: 89.9 ML
LEFT VENTRICULAR INTERNAL DIMENSION IN DIASTOLE: 5.5 CM (ref 3.5–6)
LEFT VENTRICULAR MASS: 188.51 G
LV LATERAL E/E' RATIO: 5.62 M/S
LV SEPTAL E/E' RATIO: 7.3 M/S
LVED V (TEICH): 147.57 ML
LVES V (TEICH): 89.9 ML
LVOT MG: 1.06 MMHG
LVOT MV: 0.46 CM/S
LYMPHOCYTES # BLD AUTO: 2.41 X10(3)/MCL (ref 0.6–4.6)
LYMPHOCYTES NFR BLD AUTO: 42.9 %
MAGNESIUM SERPL-MCNC: 2.1 MG/DL (ref 1.6–2.6)
MCH RBC QN AUTO: 30.8 PG (ref 27–31)
MCHC RBC AUTO-ENTMCNC: 33.5 G/DL (ref 33–36)
MCV RBC AUTO: 92.1 FL (ref 80–94)
MONOCYTES # BLD AUTO: 0.48 X10(3)/MCL (ref 0.1–1.3)
MONOCYTES NFR BLD AUTO: 8.5 %
MV MEAN GRADIENT: 1 MMHG
MV PEAK A VEL: 0 M/S
MV PEAK E VEL: 0.73 M/S
MV PEAK GRADIENT: 3 MMHG
MV STENOSIS PRESSURE HALF TIME: 48.24 MS
MV VALVE AREA BY CONTINUITY EQUATION: 2.01 CM2
MV VALVE AREA P 1/2 METHOD: 4.56 CM2
NEUTROPHILS # BLD AUTO: 2.53 X10(3)/MCL (ref 2.1–9.2)
NEUTROPHILS NFR BLD AUTO: 45 %
NRBC BLD AUTO-RTO: 0 %
OHS CV RV/LV RATIO: 0.59 CM
PHOSPHATE SERPL-MCNC: 3.8 MG/DL (ref 2.3–4.7)
PISA TR MAX VEL: 2.26 M/S
PLATELET # BLD AUTO: 246 X10(3)/MCL (ref 130–400)
PMV BLD AUTO: 9.2 FL (ref 7.4–10.4)
POTASSIUM SERPL-SCNC: 4.1 MMOL/L (ref 3.5–5.1)
PROT SERPL-MCNC: 6.3 GM/DL (ref 6.4–8.3)
PROTHROMBIN TIME: 12.6 SECONDS (ref 11.4–14)
RA PRESSURE ESTIMATED: 3 MMHG
RBC # BLD AUTO: 4.8 X10(6)/MCL (ref 4.7–6.1)
RIGHT VENTRICULAR END-DIASTOLIC DIMENSION: 3.26 CM
RV TB RVSP: 5 MMHG
SODIUM SERPL-SCNC: 139 MMOL/L (ref 136–145)
T4 FREE SERPL-MCNC: 0.85 NG/DL (ref 0.7–1.48)
TDI LATERAL: 0.13 M/S
TDI SEPTAL: 0.1 M/S
TDI: 0.12 M/S
TR MAX PG: 20 MMHG
TRIGL SERPL-MCNC: 148 MG/DL (ref 34–140)
TV REST PULMONARY ARTERY PRESSURE: 23 MMHG
VLDLC SERPL CALC-MCNC: 30 MG/DL
WBC # BLD AUTO: 5.62 X10(3)/MCL (ref 4.5–11.5)
Z-SCORE OF LEFT VENTRICULAR DIMENSION IN END DIASTOLE: -5.21
Z-SCORE OF LEFT VENTRICULAR DIMENSION IN END SYSTOLE: -1.79

## 2024-07-11 PROCEDURE — 99223 1ST HOSP IP/OBS HIGH 75: CPT | Mod: ,,, | Performed by: HOSPITALIST

## 2024-07-11 PROCEDURE — 80061 LIPID PANEL: CPT | Performed by: STUDENT IN AN ORGANIZED HEALTH CARE EDUCATION/TRAINING PROGRAM

## 2024-07-11 PROCEDURE — 85730 THROMBOPLASTIN TIME PARTIAL: CPT

## 2024-07-11 PROCEDURE — 83735 ASSAY OF MAGNESIUM: CPT

## 2024-07-11 PROCEDURE — 25000003 PHARM REV CODE 250: Performed by: STUDENT IN AN ORGANIZED HEALTH CARE EDUCATION/TRAINING PROGRAM

## 2024-07-11 PROCEDURE — 25000003 PHARM REV CODE 250: Performed by: HOSPITALIST

## 2024-07-11 PROCEDURE — 80053 COMPREHEN METABOLIC PANEL: CPT

## 2024-07-11 PROCEDURE — 36415 COLL VENOUS BLD VENIPUNCTURE: CPT

## 2024-07-11 PROCEDURE — 63600175 PHARM REV CODE 636 W HCPCS

## 2024-07-11 PROCEDURE — 85610 PROTHROMBIN TIME: CPT

## 2024-07-11 PROCEDURE — 85025 COMPLETE CBC W/AUTO DIFF WBC: CPT

## 2024-07-11 PROCEDURE — 84100 ASSAY OF PHOSPHORUS: CPT

## 2024-07-11 PROCEDURE — 84439 ASSAY OF FREE THYROXINE: CPT | Performed by: STUDENT IN AN ORGANIZED HEALTH CARE EDUCATION/TRAINING PROGRAM

## 2024-07-11 PROCEDURE — 20000000 HC ICU ROOM

## 2024-07-11 PROCEDURE — 63600175 PHARM REV CODE 636 W HCPCS: Performed by: STUDENT IN AN ORGANIZED HEALTH CARE EDUCATION/TRAINING PROGRAM

## 2024-07-11 RX ORDER — MUPIROCIN 20 MG/G
OINTMENT TOPICAL 2 TIMES DAILY
Status: COMPLETED | OUTPATIENT
Start: 2024-07-11 | End: 2024-07-15

## 2024-07-11 RX ORDER — METOPROLOL SUCCINATE 25 MG/1
25 TABLET, EXTENDED RELEASE ORAL 2 TIMES DAILY
Status: DISCONTINUED | OUTPATIENT
Start: 2024-07-11 | End: 2024-07-12

## 2024-07-11 RX ORDER — HEPARIN SODIUM,PORCINE/D5W 25000/250
0-40 INTRAVENOUS SOLUTION INTRAVENOUS CONTINUOUS
Status: DISCONTINUED | OUTPATIENT
Start: 2024-07-11 | End: 2024-07-11

## 2024-07-11 RX ORDER — ENOXAPARIN SODIUM 100 MG/ML
40 INJECTION SUBCUTANEOUS EVERY 24 HOURS
Status: DISCONTINUED | OUTPATIENT
Start: 2024-07-11 | End: 2024-07-12

## 2024-07-11 RX ADMIN — AMIODARONE HYDROCHLORIDE 0.5 MG/MIN: 1.8 INJECTION, SOLUTION INTRAVENOUS at 08:07

## 2024-07-11 RX ADMIN — HEPARIN SODIUM 12 UNITS/KG/HR: 10000 INJECTION, SOLUTION INTRAVENOUS at 02:07

## 2024-07-11 RX ADMIN — ENOXAPARIN SODIUM 40 MG: 40 INJECTION SUBCUTANEOUS at 04:07

## 2024-07-11 RX ADMIN — MUPIROCIN: 20 OINTMENT TOPICAL at 08:07

## 2024-07-11 RX ADMIN — MUPIROCIN: 20 OINTMENT TOPICAL at 09:07

## 2024-07-11 RX ADMIN — AMIODARONE HYDROCHLORIDE 1 MG/MIN: 1.8 INJECTION, SOLUTION INTRAVENOUS at 02:07

## 2024-07-11 RX ADMIN — AMIODARONE HYDROCHLORIDE 150 MG: 1.5 INJECTION, SOLUTION INTRAVENOUS at 02:07

## 2024-07-11 RX ADMIN — METOPROLOL SUCCINATE 25 MG: 25 TABLET, EXTENDED RELEASE ORAL at 08:07

## 2024-07-11 RX ADMIN — AMIODARONE HYDROCHLORIDE 0.5 MG/MIN: 1.8 INJECTION, SOLUTION INTRAVENOUS at 07:07

## 2024-07-11 RX ADMIN — METOPROLOL SUCCINATE 25 MG: 25 TABLET, EXTENDED RELEASE ORAL at 09:07

## 2024-07-11 NOTE — H&P
[facility] - ICU  Pulmonary Critical Care Note    Patient Name: Thad Payne Jr.  MRN: [id number]  Admission Date: 7/10/2024  Hospital Length of Stay: 1 days  Code Status: Full Code  Attending Provider: Geraldo Martinez MD  Primary Care Provider: Winter, Primary Doctor     Subjective:     HPI:   Thad Payne Jr. is a 43 y.o. male without any significant PMH, who presented to the ED on 7/10/2024 with CC of palpitations that started on 7/7/2024. He states that symptom was aggravated by physical activities and partially relieved by rest; he has never had this type of palpitation in the past; denies any radiating chest pain, shortness of breath, lightheadedness or recent illnesses. He does not take any prescribed meds but reported taking a new OTC male enhancement supplement intermittently for the past 3 weeks. His social history includes 1PPD since age 18, occasional alcohol use (2-4 drinks/day), no illicit drug use. In the ED: HR in the range of 130's-150's; he was given diltiazem 10mg IV x1, diltiazem 20mg IV x1, and lopressor 5mg x1 with brief response; troponin negative, TSH WNL, .6; EKG revealed A-fib without any obvious ischemic findings; CXR revealed no acute cardiopulmonary findings. He was initially admitted to hospital medicine unit on 7/10/2024 but was later upgraded to ICU level of care in the morning of 7/11/2024 for the initiation of amiodarone drip per hospital's protocol.     Hospital Course/Significant events:  7/10/2024 - Admitted to hospital medicine unit for A-fib w/ RVR  7/11/2024 - Upgraded to ICU level of care d/t refractory A-fib w/ RVR, requiring amiodarone drip    24 Hour Interval History:  Pending    History reviewed. No pertinent past medical history.    History reviewed. No pertinent surgical history.    Social History     Socioeconomic History    Marital status: Single   Tobacco Use    Smoking status: Every Day     Current packs/day: 0.50     Types: Cigarettes    Smokeless  tobacco: Never   Substance and Sexual Activity    Alcohol use: Never    Drug use: Never       Current Outpatient Medications   Medication Instructions    diclofenac (VOLTAREN) 75 mg, Oral, 2 times daily PRN, Do not take this with Advil, Ibuprofen, Motrin, Asprin, or Toradol.     Current Inpatient Medications    Current Intravenous Infusions   amiodarone in dextrose 5%  1 mg/min Intravenous Continuous 33.3 mL/hr at 07/11/24 0215 1 mg/min at 07/11/24 0215    amiodarone in dextrose 5%  0.5 mg/min Intravenous Continuous        heparin (porcine) in D5W  0-40 Units/kg/hr (Adjusted) Intravenous Continuous 11.6 mL/hr at 07/11/24 0241 12 Units/kg/hr at 07/11/24 0241     Review of Systems   Constitutional:  Negative for chills and fever.   Respiratory:  Negative for shortness of breath.    Cardiovascular:  Positive for palpitations. Negative for chest pain.        Objective:   No intake or output data in the 24 hours ending 07/11/24 0613  Vital Signs (Most Recent):  Temp: 98.1 °F (36.7 °C) (07/11/24 0205)  Pulse: 73 (07/11/24 0600)  Resp: 17 (07/11/24 0600)  BP: 114/89 (07/11/24 0600)  SpO2: 100 % (07/11/24 0600)  Body mass index is 31.45 kg/m².  Weight: 114.1 kg (251 lb 10.5 oz) Vital Signs (24h Range):  Temp:  [97.8 °F (36.6 °C)-98.2 °F (36.8 °C)] 98.1 °F (36.7 °C)  Pulse:  [] 73  Resp:  [10-24] 17  SpO2:  [97 %-100 %] 100 %  BP: ()/(70-98) 114/89     Physical Exam  General: Well nourished w/o distress  HEENT: NC/AT; PERRL; nasal and oral mucosa moist and clear  Pulm: CTA bilaterally, normal work of breathing on room air  CV: Irregularly irregular w/o murmurs or gallops; no edema noted  GI: Bowel sound present in all quadrants, abdomen soft to palpation  MSK: Full ROM of all extremities   Neuro: AAOx4; motor/sensory function intact  Psych: Cooperative; appropriate mood and affect    Lines/Drains/Airways       Peripheral Intravenous Line  Duration                  Peripheral IV - Single Lumen 07/10/24 1135 20  "G;18 G Left;Posterior Forearm <1 day                  Significant Labs:  Lab Results   Component Value Date    WBC 5.62 07/11/2024    HGB 14.8 07/11/2024    HCT 44.2 07/11/2024    MCV 92.1 07/11/2024     07/11/2024       BMP  Lab Results   Component Value Date     07/11/2024    K 4.1 07/11/2024    CO2 22 07/11/2024    BUN 10.1 07/11/2024    CREATININE 0.98 07/11/2024    CALCIUM 9.2 07/11/2024    AGAP 8.0 07/11/2024    EGFRNONAA 89 (L) 04/24/2020     ABG  No results for input(s): "PH", "PO2", "PCO2", "HCO3", "BE" in the last 168 hours.  Mechanical Ventilation Support:       Significant Imaging:  I have reviewed the pertinent imaging within the past 24 hours.    Assessment/Plan:     Assessment  Newly onset and refractory A-fib w/ RVR, requiring amiodarone infusion  No obvious signs of infection, low pre-test probability for PE (Wells' score of 1.5 indicating low risk), recent use of male sexual enhancing supplementation   CHADVASC score of 0  TSH 1.23  Troponin negative on admission  Tobacco abuse     Plan  -Admitted to ICU for ongoing monitoring and medical management; will plan to downgrade patient to hospital medicine unit at the end of Amiodarone drip protocol   -With CHADVASC score of 0, will plan to stop heparin drip and transition to DVT PPX  -Will start metoprolol succinate PO with the attempt to wean patient off of amiodarone infusion  -Will obtain echocardiogram to assess for presence of heart failure  -Pending: A1c, lipid panel    DVT Prophylaxis: Heparin drip  GI Prophylaxis: None     32 minutes of critical care was time spent personally by me on the following activities: development of treatment plan with patient or surrogate and bedside caregivers, discussions with consultants, evaluation of patient's response to treatment, examination of patient, ordering and performing treatments and interventions, ordering and review of laboratory studies, ordering and review of radiographic studies, " pulse oximetry, re-evaluation of patient's condition.  This critical care time did not overlap with that of any other provider or involve time for any procedures.     Lisa Virk DO, PGY-III  Pulmonary & Critical Care Medicine  [facility] - ICU

## 2024-07-11 NOTE — ED NOTES
Dr Eubanks and informed him pt heart rate has been sustained above the 120s since 1900 and nothing has been given since 1500. Informed him pt blood pressure was 89/65. Md stated to given prn metoprolol

## 2024-07-11 NOTE — NURSING
Patient is being transferred to ICU via tech and nurse. Patient will be transferred to ICU bed 428 for Amiodarone drip per hospital  policy. Patient heart rate is intermittently ranging from 130's to 150's. Report was given to AIDA Norris.

## 2024-07-11 NOTE — ED NOTES
Spoke with ashley mckenna she stated nurse in a room stated to give then 5 minutes to look at the pt and they will call back.

## 2024-07-11 NOTE — PLAN OF CARE
07/11/24 1204   Discharge Assessment   Assessment Type Discharge Planning Assessment   Confirmed/corrected address, phone number and insurance Yes   Confirmed Demographics Correct on Facesheet   Source of Information patient   When was your last doctors appointment?   (Paulino Cosme)   Reason For Admission Atrial fibrillation, Chest pain   People in Home alone   Facility Arrived From: Home   Do you expect to return to your current living situation? Yes   Do you have help at home or someone to help you manage your care at home? Yes   Who are your caregiver(s) and their phone number(s)? Rufino Henning (Children's Mother)  [phone]; Mother, Marixa Payne ([phone])   Prior to hospitilization cognitive status: Alert/Oriented   Walking or Climbing Stairs Difficulty no   Dressing/Bathing Difficulty no   Equipment Currently Used at Home none   Readmission within 30 days? No   Patient currently being followed by outpatient case management? No   Do you currently have service(s) that help you manage your care at home? No   Do you take prescription medications? Yes  ([facility] [address])   Do you have prescription coverage? No   Do you have any problems affording any of your prescribed medications? TBD   Is the patient taking medications as prescribed? yes   Who is going to help you get home at discharge? Family   How do you get to doctors appointments? car, drives self   Are you on dialysis? No   Discharge Plan A Home   DME Needed Upon Discharge  none   Discharge Plan discussed with: Patient   Transition of Care Barriers None   Housing Stability   In the last 12 months, was there a time when you were not able to pay the mortgage or rent on time? N   At any time in the past 12 months, were you homeless or living in a shelter (including now)? N   Transportation Needs   Has the lack of transportation kept you from medical appointments, meetings, work or from getting things needed for daily living? No   Food Insecurity    Within the past 12 months, you worried that your food would run out before you got the money to buy more. Never true   Within the past 12 months, the food you bought just didn't last and you didn't have money to get more. Never true   Stress   Do you feel stress - tense, restless, nervous, or anxious, or unable to sleep at night because your mind is troubled all the time - these days? Not at all   Social Isolation   How often do you feel lonely or isolated from those around you?  Never   [employer]   In the past 12 months has the electric, gas, oil, or water company threatened to shut off services in your home? No   Health Literacy   How often do you need to have someone help you when you read instructions, pamphlets, or other written material from your doctor or pharmacy? Never     Pt single with 3 children; Resides alone; Employed with [employer] as a ; Independent with ADL's; Uninsured - Pt will be screened for M/D eligibility; CM to follow for d/c planning needs.

## 2024-07-12 LAB
ANION GAP SERPL CALC-SCNC: 10 MEQ/L
BASOPHILS # BLD AUTO: 0.03 X10(3)/MCL
BASOPHILS NFR BLD AUTO: 0.5 %
BUN SERPL-MCNC: 9.1 MG/DL (ref 8.9–20.6)
CALCIUM SERPL-MCNC: 9.8 MG/DL (ref 8.4–10.2)
CHLORIDE SERPL-SCNC: 104 MMOL/L (ref 98–107)
CO2 SERPL-SCNC: 26 MMOL/L (ref 22–29)
CREAT SERPL-MCNC: 1.19 MG/DL (ref 0.73–1.18)
CREAT/UREA NIT SERPL: 8
EOSINOPHIL # BLD AUTO: 0.11 X10(3)/MCL (ref 0–0.9)
EOSINOPHIL NFR BLD AUTO: 1.8 %
ERYTHROCYTE [DISTWIDTH] IN BLOOD BY AUTOMATED COUNT: 13.9 % (ref 11.5–17)
EST. AVERAGE GLUCOSE BLD GHB EST-MCNC: 116.9 MG/DL
GFR SERPLBLD CREATININE-BSD FMLA CKD-EPI: >60 ML/MIN/1.73/M2
GLUCOSE SERPL-MCNC: 110 MG/DL (ref 74–100)
HBA1C MFR BLD: 5.7 %
HCT VFR BLD AUTO: 45.7 % (ref 42–52)
HGB BLD-MCNC: 15 G/DL (ref 14–18)
IMM GRANULOCYTES # BLD AUTO: 0.02 X10(3)/MCL (ref 0–0.04)
IMM GRANULOCYTES NFR BLD AUTO: 0.3 %
LYMPHOCYTES # BLD AUTO: 1.93 X10(3)/MCL (ref 0.6–4.6)
LYMPHOCYTES NFR BLD AUTO: 31.5 %
MAGNESIUM SERPL-MCNC: 2.3 MG/DL (ref 1.6–2.6)
MCH RBC QN AUTO: 30.4 PG (ref 27–31)
MCHC RBC AUTO-ENTMCNC: 32.8 G/DL (ref 33–36)
MCV RBC AUTO: 92.7 FL (ref 80–94)
MONOCYTES # BLD AUTO: 0.79 X10(3)/MCL (ref 0.1–1.3)
MONOCYTES NFR BLD AUTO: 12.9 %
NEUTROPHILS # BLD AUTO: 3.25 X10(3)/MCL (ref 2.1–9.2)
NEUTROPHILS NFR BLD AUTO: 53 %
NRBC BLD AUTO-RTO: 0 %
PHOSPHATE SERPL-MCNC: 5 MG/DL (ref 2.3–4.7)
PLATELET # BLD AUTO: 259 X10(3)/MCL (ref 130–400)
PMV BLD AUTO: 9.3 FL (ref 7.4–10.4)
POTASSIUM SERPL-SCNC: 4 MMOL/L (ref 3.5–5.1)
RBC # BLD AUTO: 4.93 X10(6)/MCL (ref 4.7–6.1)
SODIUM SERPL-SCNC: 140 MMOL/L (ref 136–145)
WBC # BLD AUTO: 6.13 X10(3)/MCL (ref 4.5–11.5)

## 2024-07-12 PROCEDURE — 36415 COLL VENOUS BLD VENIPUNCTURE: CPT | Performed by: STUDENT IN AN ORGANIZED HEALTH CARE EDUCATION/TRAINING PROGRAM

## 2024-07-12 PROCEDURE — 63600175 PHARM REV CODE 636 W HCPCS

## 2024-07-12 PROCEDURE — 83735 ASSAY OF MAGNESIUM: CPT

## 2024-07-12 PROCEDURE — 25000003 PHARM REV CODE 250

## 2024-07-12 PROCEDURE — 84100 ASSAY OF PHOSPHORUS: CPT

## 2024-07-12 PROCEDURE — 80048 BASIC METABOLIC PNL TOTAL CA: CPT | Performed by: STUDENT IN AN ORGANIZED HEALTH CARE EDUCATION/TRAINING PROGRAM

## 2024-07-12 PROCEDURE — 83036 HEMOGLOBIN GLYCOSYLATED A1C: CPT | Performed by: STUDENT IN AN ORGANIZED HEALTH CARE EDUCATION/TRAINING PROGRAM

## 2024-07-12 PROCEDURE — 25000003 PHARM REV CODE 250: Performed by: INTERNAL MEDICINE

## 2024-07-12 PROCEDURE — 25000003 PHARM REV CODE 250: Performed by: HOSPITALIST

## 2024-07-12 PROCEDURE — 93005 ELECTROCARDIOGRAM TRACING: CPT

## 2024-07-12 PROCEDURE — 63600175 PHARM REV CODE 636 W HCPCS: Performed by: INTERNAL MEDICINE

## 2024-07-12 PROCEDURE — 99291 CRITICAL CARE FIRST HOUR: CPT | Mod: ,,, | Performed by: INTERNAL MEDICINE

## 2024-07-12 PROCEDURE — 85025 COMPLETE CBC W/AUTO DIFF WBC: CPT | Performed by: STUDENT IN AN ORGANIZED HEALTH CARE EDUCATION/TRAINING PROGRAM

## 2024-07-12 PROCEDURE — 20000000 HC ICU ROOM

## 2024-07-12 RX ORDER — HYDROXYZINE PAMOATE 25 MG/1
25 CAPSULE ORAL EVERY 8 HOURS PRN
Status: DISCONTINUED | OUTPATIENT
Start: 2024-07-12 | End: 2024-07-16 | Stop reason: HOSPADM

## 2024-07-12 RX ORDER — METOPROLOL TARTRATE 25 MG/1
25 TABLET, FILM COATED ORAL 4 TIMES DAILY
Status: DISCONTINUED | OUTPATIENT
Start: 2024-07-12 | End: 2024-07-13

## 2024-07-12 RX ORDER — ENOXAPARIN SODIUM 150 MG/ML
1 INJECTION SUBCUTANEOUS EVERY 24 HOURS
Status: DISCONTINUED | OUTPATIENT
Start: 2024-07-12 | End: 2024-07-12

## 2024-07-12 RX ORDER — DIGOXIN 0.25 MG/ML
500 INJECTION INTRAMUSCULAR; INTRAVENOUS ONCE
Status: COMPLETED | OUTPATIENT
Start: 2024-07-12 | End: 2024-07-12

## 2024-07-12 RX ORDER — ESMOLOL HYDROCHLORIDE 20 MG/ML
0-300 INJECTION, SOLUTION INTRAVENOUS CONTINUOUS
Status: DISCONTINUED | OUTPATIENT
Start: 2024-07-12 | End: 2024-07-15

## 2024-07-12 RX ORDER — ENOXAPARIN SODIUM 150 MG/ML
1 INJECTION SUBCUTANEOUS EVERY 12 HOURS
Status: DISCONTINUED | OUTPATIENT
Start: 2024-07-12 | End: 2024-07-15

## 2024-07-12 RX ADMIN — HYDROXYZINE PAMOATE 25 MG: 25 CAPSULE ORAL at 06:07

## 2024-07-12 RX ADMIN — ESMOLOL HYDROCHLORIDE 50 MCG/KG/MIN: 20 INJECTION INTRAVENOUS at 08:07

## 2024-07-12 RX ADMIN — AMIODARONE HYDROCHLORIDE 0.5 MG/MIN: 1.8 INJECTION, SOLUTION INTRAVENOUS at 06:07

## 2024-07-12 RX ADMIN — AMIODARONE HYDROCHLORIDE 0.5 MG/MIN: 1.8 INJECTION, SOLUTION INTRAVENOUS at 10:07

## 2024-07-12 RX ADMIN — METOPROLOL TARTRATE 25 MG: 25 TABLET, FILM COATED ORAL at 09:07

## 2024-07-12 RX ADMIN — AMIODARONE HYDROCHLORIDE 0.5 MG/MIN: 1.8 INJECTION, SOLUTION INTRAVENOUS at 07:07

## 2024-07-12 RX ADMIN — METOPROLOL TARTRATE 25 MG: 25 TABLET, FILM COATED ORAL at 08:07

## 2024-07-12 RX ADMIN — DIGOXIN 500 MCG: 0.25 INJECTION INTRAMUSCULAR; INTRAVENOUS at 08:07

## 2024-07-12 RX ADMIN — ENOXAPARIN SODIUM 105 MG: 120 INJECTION SUBCUTANEOUS at 09:07

## 2024-07-12 RX ADMIN — SODIUM CHLORIDE, POTASSIUM CHLORIDE, SODIUM LACTATE AND CALCIUM CHLORIDE 500 ML: 600; 310; 30; 20 INJECTION, SOLUTION INTRAVENOUS at 02:07

## 2024-07-12 RX ADMIN — MUPIROCIN: 20 OINTMENT TOPICAL at 09:07

## 2024-07-12 RX ADMIN — METOPROLOL TARTRATE 25 MG: 25 TABLET, FILM COATED ORAL at 04:07

## 2024-07-12 RX ADMIN — ENOXAPARIN SODIUM 105 MG: 120 INJECTION SUBCUTANEOUS at 08:07

## 2024-07-12 RX ADMIN — MUPIROCIN: 20 OINTMENT TOPICAL at 08:07

## 2024-07-12 NOTE — CONSULTS
Cardiology Consult Note     Admitting Team: [facility] Internal Medicine  Attending Physician: Geraldo Martinez MD  Cardiology Attending Physician: Alvaro Padilla MD    Date of Admit: 7/10/2024    Reason for Consult: atrial fibrillation     Subjective:      History of Present Illness:  Thad Payne Jr. is a 43 y.o. male with PMHx ETOH use who presents with acute onset, severe palpitations that were persistent over 1 day.  Patient was in his usual state of health (very active at work, works as a ) until the day of presentation when he was noted to have increasing palpitations after exertion.  He is palpitations led to associated mild lightheadedness without chest discomfort or shortness of breath.  These symptoms persisted and he presented to the emergency department for further evaluation.  Of note, patient notes drinking alcohol 2-4 times daily (1 beer and up to 3-4 shots daily).  He denies any family history of atrial fibrillation, early cardiac death or cardiomyopathy.  In the ER he was noted to have an irregularly irregular rhythm with heart rates elevated into the 130s to 150s he was given 2 dose of IV diltiazem, 1 dose of IV Lopressor brief responses to a normal heart rate.  Patient admitted to internal medicine service for evaluation management of atrial fibrillation.  Patient noted to go back into rapid ventricular response and started on amiodarone infusion and stepped up to the ICU for further management.  Cardiology consulted for further assistance and evaluation and management of atrial fibrillation.      History obtained by patient interview and supplemented by nursing documentation and chart review.     PMHx: none   SurgHx:  none   FamHx: noncontributory   SocialHx: + ETOH (2-4 drinks/daily), -smoking, - illicits. Works as a .   Allergies: No Known Allergies  Home Meds: none    Review of Systems: All systems have been reviewed and are negative unless otherwise noted in HPI.     Objective:    Last 24 Hour Vital Signs:  BP  Min: 94/81  Max: 130/100  Temp  Av °F (36.7 °C)  Min: 97.8 °F (36.6 °C)  Max: 98.1 °F (36.7 °C)  Pulse  Av.4  Min: 72  Max: 145  Resp  Av.7  Min: 12  Max: 26  SpO2  Av %  Min: 89 %  Max: 100 %  Body mass index is 28.62 kg/m².  I/O last 3 completed shifts:  In: 836.1 [I.V.:678.3; IV Piggyback:157.9]  Out: -   Physical Examination:  Nursing note and vital signs reviewed.   Constitutional: NAD, not ill-appearing  HEENT: NCAT, PERRLA, normal conjunctivae, JVP 8 cm H2O  Cardiovascular: irregularly irregular rhythm, tachycardic, no murmurs noted, no chest tenderness to palpation, normal pulses in radial & dorsalis pedis bilaterally   Pulmonary: normal respiratory effort, CTAB, no crackles, wheezes  Abdominal: S/NT/ND  Musculoskeletal: No edema noted to bilateral lower extremities      Neurological: Alert & oriented to self, location, time and situation. At baseline neurological function.  Skin: warm & dry. Capillary refill < 2 seconds.     Laboratory:  Recent Labs   Lab 07/10/24  1123 24  0239 24  0353   WBC 6.31 5.62 6.13   HGB 15.9 14.8 15.0   HCT 48.5 44.2 45.7    246 259   MCV 93.4 92.1 92.7   RDW 14.1 14.0 13.9    139 140   K 4.4 4.1 4.0    109* 104   CO2 26 22 26   BUN 10.8 10.1 9.1   CREATININE 1.11 0.98 1.19*   ALBUMIN 3.8 3.4*  --    BILITOT 0.5 0.5  --    AST 26 32  --    ALKPHOS 61 57  --    ALT 51 55  --      Cardiac Data:  Recent Labs   Lab 07/10/24  1123   TROPONINI <0.010   .6*     Other Results:  EKG (my interpretation):  Atrial fibrillation with rapid ventricular response, no ST-T wave changes indicative of ischemia    TTE:  LVEF 35-40%, grade 1 diastolic dysfunction, mild LA dilation, mild MR/TR, PASP 23, RAP 3    Radiology:  X-Ray Chest 1 View   Final Result      No acute pulmonary process identified.         Electronically signed by: Rich Villafuerte   Date:    07/10/2024   Time:    11:09        Current  Medications   enoxparin  1 mg/kg Subcutaneous Q12H (treatment, non-standard time)    mupirocin   Nasal BID       amiodarone in dextrose 5%  0.5 mg/min Intravenous Continuous        esmolol  0-300 mcg/kg/min Intravenous Continuous            Current Facility-Administered Medications:     dextrose 10%, 12.5 g, Intravenous, PRN    dextrose 10%, 25 g, Intravenous, PRN    glucagon (human recombinant), 1 mg, Intramuscular, PRN    glucose, 16 g, Oral, PRN    glucose, 24 g, Oral, PRN    naloxone, 0.02 mg, Intravenous, PRN    nicotine, 1 patch, Transdermal, Daily PRN    sodium chloride 0.9%, 10 mL, Intravenous, Q12H PRN     Assessment:     Thad Payne  is a 43 y.o. male with a PMHx of ETOH use presenting with new onset atrial fibrillation with rapid ventricular response (likely due to ETOH use) in ICU on amiodarone infusion with uncontrolled rates.    Atrial fibrillation with rapid ventricular response   ETOH use      Plan:     -Continue amiodarone infusion (would increase rate to 1.0 mg/hr) for improved rate control with goal HR < 100   -Change Toprol to metoprolol tartrate 25 mg PO every 6 hours  -Continue enoxaparin 1 mg/kg BID for anticoagulation  -May need BESSY +/- DCCV if unable to be controlled with PO/IV medications  -Continuous cardiac telemetry  -Keep K>4, Mg>2    We will continue to follow with you.    Eduard Swain M.D.  \[facility] [place] [facility]\"" Cardiology Fellow PGY-4

## 2024-07-12 NOTE — PROGRESS NOTES
[facility] - ICU  Pulmonary Critical Care Note    Patient Name: Thad Payne Jr.  MRN: [id number]  Admission Date: 7/10/2024  Hospital Length of Stay: 2 days  Code Status: Full Code  Attending Provider: Geraldo Martinez MD  Primary Care Provider: Winter, Primary Doctor     Subjective:     HPI:   Thad Payne Jr. is a 43 y.o. male without any significant PMH, who presented to the ED on 7/10/2024 with CC of palpitations that started on 7/7/2024. He states that symptom was aggravated by physical activities and partially relieved by rest; he has never had this type of palpitation in the past; denies any radiating chest pain, shortness of breath, lightheadedness or recent illnesses. He does not take any prescribed meds but reported taking a new OTC male enhancement supplement intermittently for the past 3 weeks. His social history includes 1PPD since age 18, occasional alcohol use (2-4 drinks/day), no illicit drug use. In the ED: HR in the range of 130's-150's; he was given diltiazem 10mg IV x1, diltiazem 20mg IV x1, and lopressor 5mg x1 with brief response; troponin negative, TSH WNL, .6; EKG revealed A-fib without any obvious ischemic findings; CXR revealed no acute cardiopulmonary findings. He was initially admitted to hospital medicine unit on 7/10/2024 but was later upgraded to ICU level of care in the morning of 7/11/2024 for the initiation of amiodarone drip per hospital's protocol.     Hospital Course/Significant events:  7/10/2024 - Admitted to hospital medicine unit for A-fib w/ RVR  7/11/2024 - Upgraded to ICU level of care d/t refractory A-fib w/ RVR, requiring amiodarone drip    24 Hour Interval History:  Received 24hrs of amio. Continues to be in afib w/ RVR with HR ranging from 90-170s, avg ~130. EF 35-40% on echo yesterday.     History reviewed. No pertinent past medical history.    History reviewed. No pertinent surgical history.    Social History     Socioeconomic History    Marital status:  Single   Tobacco Use    Smoking status: Every Day     Current packs/day: 0.50     Types: Cigarettes    Smokeless tobacco: Never   Substance and Sexual Activity    Alcohol use: Never    Drug use: Never     Social Determinants of Health     Financial Resource Strain: Low Risk  (7/12/2024)    Overall Financial Resource Strain (CARDIA)     Difficulty of Paying Living Expenses: Not hard at all   Food Insecurity: No Food Insecurity (7/12/2024)    Hunger Vital Sign     Worried About Running Out of Food in the Last Year: Never true     Ran Out of Food in the Last Year: Never true   Transportation Needs: No Transportation Needs (7/12/2024)    TRANSPORTATION NEEDS     Transportation : No   Stress: No Stress Concern Present (7/12/2024)    [employer] [place] [employer] - Occupational Stress Questionnaire     Feeling of Stress : Not at all   Housing Stability: Low Risk  (7/12/2024)    Housing Stability Vital Sign     Unable to Pay for Housing in the Last Year: No     Homeless in the Last Year: No       Current Outpatient Medications   Medication Instructions    diclofenac (VOLTAREN) 75 mg, Oral, 2 times daily PRN, Do not take this with Advil, Ibuprofen, Motrin, Asprin, or Toradol.     Current Inpatient Medications   enoxparin  40 mg Subcutaneous Q24H (prophylaxis, 1700)    metoprolol succinate  25 mg Oral BID    mupirocin   Nasal BID     Current Intravenous Infusions   amiodarone in dextrose 5%  0.5 mg/min Intravenous Continuous 16.7 mL/hr at 07/12/24 0535 0.5 mg/min at 07/12/24 0535     Review of Systems   Constitutional:  Negative for chills and fever.   Respiratory:  Negative for shortness of breath.    Cardiovascular:  Positive for palpitations. Negative for chest pain.        Objective:     Intake/Output Summary (Last 24 hours) at 7/12/2024 0620  Last data filed at 7/12/2024 0535  Gross per 24 hour   Intake 836.11 ml   Output --   Net 836.11 ml     Vital Signs (Most Recent):  Temp: 98 °F (36.7 °C) (07/12/24  "0301)  Pulse: 92 (07/12/24 0600)  Resp: 14 (07/12/24 0600)  BP: 106/68 (07/12/24 0600)  SpO2: 99 % (07/12/24 0600)  Body mass index is 28.62 kg/m².  Weight: 103.9 kg (229 lb) Vital Signs (24h Range):  Temp:  [97.8 °F (36.6 °C)-98.1 °F (36.7 °C)] 98 °F (36.7 °C)  Pulse:  [] 92  Resp:  [12-26] 14  SpO2:  [89 %-100 %] 99 %  BP: ()/() 106/68     Physical Exam  Vitals reviewed.   Constitutional:       General: He is not in acute distress.  HENT:      Mouth/Throat:      Mouth: Mucous membranes are moist.   Eyes:      Conjunctiva/sclera: Conjunctivae normal.      Pupils: Pupils are equal, round, and reactive to light.   Cardiovascular:      Rate and Rhythm: Tachycardia present. Rhythm irregular.      Pulses: Normal pulses.      Heart sounds: Normal heart sounds.   Pulmonary:      Effort: Pulmonary effort is normal. No respiratory distress.      Breath sounds: Normal breath sounds.   Abdominal:      General: There is no distension.      Palpations: Abdomen is soft.   Musculoskeletal:         General: No tenderness.   Skin:     General: Skin is warm and dry.      Capillary Refill: Capillary refill takes less than 2 seconds.   Neurological:      General: No focal deficit present.      Mental Status: He is alert.         Lines/Drains/Airways       Peripheral Intravenous Line  Duration                  Peripheral IV - Single Lumen 07/10/24 1135 20 G;18 G Posterior;Right Forearm 1 day                  Significant Labs:  Lab Results   Component Value Date    WBC 6.13 07/12/2024    HGB 15.0 07/12/2024    HCT 45.7 07/12/2024    MCV 92.7 07/12/2024     07/12/2024       BMP  Lab Results   Component Value Date     07/12/2024    K 4.0 07/12/2024    CO2 26 07/12/2024    BUN 9.1 07/12/2024    CREATININE 1.19 (H) 07/12/2024    CALCIUM 9.8 07/12/2024    AGAP 10.0 07/12/2024    EGFRNONAA 89 (L) 04/24/2020     ABG  No results for input(s): "PH", "PO2", "PCO2", "HCO3", "BE" in the last 168 hours.  Mechanical " Ventilation Support:       Significant Imaging:  I have reviewed the pertinent imaging within the past 24 hours.    Assessment/Plan:     Assessment  Newly onset and refractory A-fib w/ RVR, requiring amiodarone infusion  No obvious signs of infection, low pre-test probability for PE (Wells' score of 1.5 indicating low risk), recent use of male sexual enhancing supplementation   CHADVASC score of 0  TSH 1.23  Troponin negative on admission  Tobacco abuse     Plan  -Will start FD lovenox   -hold metoprolol in setting of new onset HR, EF35-40% on echo yesterday   -TTE   -continue amio drip   -will start esmolol drip  -consider digoxin   -cardiology consult today for new onset afib with RVR and new onset HF  -A1c 5.7%, TSH/T4 wnl, electrolytes wnl, lipid panel unremarkable     DVT Prophylaxis: FD Lovenox  GI Prophylaxis: None     32 minutes of critical care was time spent personally by me on the following activities: development of treatment plan with patient or surrogate and bedside caregivers, discussions with consultants, evaluation of patient's response to treatment, examination of patient, ordering and performing treatments and interventions, ordering and review of laboratory studies, ordering and review of radiographic studies, pulse oximetry, re-evaluation of patient's condition.  This critical care time did not overlap with that of any other provider or involve time for any procedures.     Remedios Carey MD, PGY-III  Pulmonary & Critical Care Medicine  [facility] - ICU

## 2024-07-13 LAB
HOLD SPECIMEN: NORMAL
MAGNESIUM SERPL-MCNC: 2.2 MG/DL (ref 1.6–2.6)
PHOSPHATE SERPL-MCNC: 4.9 MG/DL (ref 2.3–4.7)

## 2024-07-13 PROCEDURE — 85025 COMPLETE CBC W/AUTO DIFF WBC: CPT

## 2024-07-13 PROCEDURE — 36415 COLL VENOUS BLD VENIPUNCTURE: CPT

## 2024-07-13 PROCEDURE — 36415 COLL VENOUS BLD VENIPUNCTURE: CPT | Performed by: HOSPITALIST

## 2024-07-13 PROCEDURE — 63600175 PHARM REV CODE 636 W HCPCS

## 2024-07-13 PROCEDURE — 25000003 PHARM REV CODE 250

## 2024-07-13 PROCEDURE — 83735 ASSAY OF MAGNESIUM: CPT

## 2024-07-13 PROCEDURE — 20000000 HC ICU ROOM

## 2024-07-13 PROCEDURE — 84100 ASSAY OF PHOSPHORUS: CPT

## 2024-07-13 RX ORDER — METOPROLOL TARTRATE 50 MG/1
50 TABLET ORAL 3 TIMES DAILY
Status: DISCONTINUED | OUTPATIENT
Start: 2024-07-13 | End: 2024-07-15

## 2024-07-13 RX ORDER — CETIRIZINE HYDROCHLORIDE 10 MG/1
10 TABLET ORAL DAILY
Status: DISCONTINUED | OUTPATIENT
Start: 2024-07-13 | End: 2024-07-16 | Stop reason: HOSPADM

## 2024-07-13 RX ADMIN — METOPROLOL TARTRATE 50 MG: 50 TABLET, FILM COATED ORAL at 08:07

## 2024-07-13 RX ADMIN — MUPIROCIN: 20 OINTMENT TOPICAL at 08:07

## 2024-07-13 RX ADMIN — METOPROLOL TARTRATE 50 MG: 50 TABLET, FILM COATED ORAL at 03:07

## 2024-07-13 RX ADMIN — HYDROXYZINE PAMOATE 25 MG: 25 CAPSULE ORAL at 08:07

## 2024-07-13 RX ADMIN — CETIRIZINE HYDROCHLORIDE 10 MG: 10 TABLET, FILM COATED ORAL at 08:07

## 2024-07-13 RX ADMIN — ENOXAPARIN SODIUM 105 MG: 120 INJECTION SUBCUTANEOUS at 08:07

## 2024-07-13 RX ADMIN — AMIODARONE HYDROCHLORIDE 0.5 MG/MIN: 1.8 INJECTION, SOLUTION INTRAVENOUS at 07:07

## 2024-07-13 RX ADMIN — AMIODARONE HYDROCHLORIDE 0.5 MG/MIN: 1.8 INJECTION, SOLUTION INTRAVENOUS at 06:07

## 2024-07-13 RX ADMIN — HYDROXYZINE PAMOATE 25 MG: 25 CAPSULE ORAL at 03:07

## 2024-07-13 RX ADMIN — HYDROXYZINE PAMOATE 25 MG: 25 CAPSULE ORAL at 11:07

## 2024-07-13 NOTE — PROGRESS NOTES
[facility] - ICU  Pulmonary Critical Care Note    Patient Name: Thad Payne Jr.  MRN: [id number]  Admission Date: 7/10/2024  Hospital Length of Stay: 3 days  Code Status: Full Code  Attending Provider: Geraldo Martinez MD  Primary Care Provider: Winter, Primary Doctor     Subjective:     HPI:   Thad Payne Jr. is a 43 y.o. male without any significant PMH, who presented to the ED on 7/10/2024 with CC of palpitations that started on 7/7/2024. He states that symptom was aggravated by physical activities and partially relieved by rest; he has never had this type of palpitation in the past; denies any radiating chest pain, shortness of breath, lightheadedness or recent illnesses. He does not take any prescribed meds but reported taking a new OTC male enhancement supplement intermittently for the past 3 weeks. His social history includes 1PPD since age 18, occasional alcohol use (2-4 drinks/day), no illicit drug use. In the ED: HR in the range of 130's-150's; he was given diltiazem 10mg IV x1, diltiazem 20mg IV x1, and lopressor 5mg x1 with brief response; troponin negative, TSH WNL, .6; EKG revealed A-fib without any obvious ischemic findings; CXR revealed no acute cardiopulmonary findings. He was initially admitted to hospital medicine unit on 7/10/2024 but was later upgraded to ICU level of care in the morning of 7/11/2024 for the initiation of amiodarone drip per hospital's protocol.     Hospital Course/Significant events:  7/10/2024 - Admitted to hospital medicine unit for A-fib w/ RVR  7/11/2024 - Upgraded to ICU level of care d/t refractory A-fib w/ RVR, requiring amiodarone drip  7/12 - some improvement with esmolol drip, BP unable to tolerate. No objective improvement with digoxin. Continues to be on amio drip.    24 Hour Interval History:  Continues to be in afib w/ RVR with HR ranging from 90-140s, avg ~100s. EF 35-40% on admit echo. HR responded to esmolol, however BP did not tolerate continued  dosing and it was stopped. Received digoxin 500mcg yesterday without noticeable improvement in HR. Currently still on amio, received 48hrs of amio.     History reviewed. No pertinent past medical history.    History reviewed. No pertinent surgical history.    Social History     Socioeconomic History    Marital status: Single   Tobacco Use    Smoking status: Every Day     Current packs/day: 0.50     Types: Cigarettes    Smokeless tobacco: Never   Substance and Sexual Activity    Alcohol use: Never    Drug use: Never     Social Determinants of Health     Financial Resource Strain: Low Risk  (7/12/2024)    Overall Financial Resource Strain (CARDIA)     Difficulty of Paying Living Expenses: Not hard at all   Food Insecurity: No Food Insecurity (7/12/2024)    Hunger Vital Sign     Worried About Running Out of Food in the Last Year: Never true     Ran Out of Food in the Last Year: Never true   Transportation Needs: No Transportation Needs (7/12/2024)    TRANSPORTATION NEEDS     Transportation : No   Stress: No Stress Concern Present (7/12/2024)    [employer] - Occupational Stress Questionnaire     Feeling of Stress : Not at all   Housing Stability: Low Risk  (7/12/2024)    Housing Stability Vital Sign     Unable to Pay for Housing in the Last Year: No     Homeless in the Last Year: No       Current Outpatient Medications   Medication Instructions    diclofenac (VOLTAREN) 75 mg, Oral, 2 times daily PRN, Do not take this with Advil, Ibuprofen, Motrin, Asprin, or Toradol.     Current Inpatient Medications   enoxparin  1 mg/kg Subcutaneous Q12H (treatment, non-standard time)    metoprolol tartrate  50 mg Oral TID    mupirocin   Nasal BID     Current Intravenous Infusions   amiodarone in dextrose 5%  0.5 mg/min Intravenous Continuous 16.7 mL/hr at 07/13/24 0627 0.5 mg/min at 07/13/24 0627    esmolol  0-300 mcg/kg/min Intravenous Continuous   Stopped at 07/12/24 1415     Review of Systems    Constitutional:  Negative for chills and fever.   Respiratory:  Negative for shortness of breath.    Cardiovascular:  Positive for palpitations. Negative for chest pain.        Objective:     Intake/Output Summary (Last 24 hours) at 7/13/2024 0825  Last data filed at 7/13/2024 0538  Gross per 24 hour   Intake 923.93 ml   Output --   Net 923.93 ml     Vital Signs (Most Recent):  Temp: 97.6 °F (36.4 °C) (07/13/24 0701)  Pulse: (!) 115 (07/13/24 0803)  Resp: 15 (07/13/24 0700)  BP: 104/86 (07/13/24 0803)  SpO2: 98 % (07/13/24 0700)  Body mass index is 28.62 kg/m².  Weight: 103.9 kg (229 lb) Vital Signs (24h Range):  Temp:  [97.6 °F (36.4 °C)-98.7 °F (37.1 °C)] 97.6 °F (36.4 °C)  Pulse:  [] 115  Resp:  [4-28] 15  SpO2:  [93 %-100 %] 98 %  BP: ()/(61-87) 104/86     Physical Exam  Vitals reviewed.   Constitutional:       General: He is not in acute distress.  HENT:      Mouth/Throat:      Mouth: Mucous membranes are moist.   Eyes:      Conjunctiva/sclera: Conjunctivae normal.      Pupils: Pupils are equal, round, and reactive to light.   Cardiovascular:      Rate and Rhythm: Tachycardia present. Rhythm irregular.      Pulses: Normal pulses.      Heart sounds: Normal heart sounds.   Pulmonary:      Effort: Pulmonary effort is normal. No respiratory distress.      Breath sounds: Normal breath sounds.   Abdominal:      General: There is no distension.      Palpations: Abdomen is soft.   Musculoskeletal:         General: No tenderness.   Skin:     General: Skin is warm and dry.      Capillary Refill: Capillary refill takes less than 2 seconds.   Neurological:      General: No focal deficit present.      Mental Status: He is alert.         Lines/Drains/Airways       Peripheral Intravenous Line  Duration                  Peripheral IV - Single Lumen 07/10/24 1135 20 G;18 G Posterior;Right Forearm 2 days                  Significant Labs:  Lab Results   Component Value Date    WBC 6.13 07/12/2024    HGB 15.0  "07/12/2024    HCT 45.7 07/12/2024    MCV 92.7 07/12/2024     07/12/2024       BMP  Lab Results   Component Value Date     07/12/2024    K 4.0 07/12/2024    CO2 26 07/12/2024    BUN 9.1 07/12/2024    CREATININE 1.19 (H) 07/12/2024    CALCIUM 9.8 07/12/2024    AGAP 10.0 07/12/2024    EGFRNONAA 89 (L) 04/24/2020     ABG  No results for input(s): "PH", "PO2", "PCO2", "HCO3", "BE" in the last 168 hours.  Mechanical Ventilation Support:       Significant Imaging:  I have reviewed the pertinent imaging within the past 24 hours.    Assessment/Plan:     Assessment  Newly onset and refractory A-fib w/ RVR, requiring amiodarone infusion  No obvious signs of infection, low pre-test probability for PE (Wells' score of 1.5 indicating low risk), recent use of male sexual enhancing supplementation   CHADVASC score of 0  TSH 1.23  Troponin negative on admission  Tobacco abuse     Plan  -Will start FD lovenox   -EF 35-40% with moderately reduced systolic function, likely secondary to tachycardia, avoid CCB  -continue amio drip   -holding esmolol drip as BP can no longer tolerate   -received 500mcg digoxin yesterday, consider repeating dosing  -cardiology consulted, recommending increasing metoprolol each dose until HR control or BP can no longer tolerate.  -A1c 5.7%, TSH/T4 wnl, electrolytes wnl, lipid panel unremarkable     DVT Prophylaxis: FD Lovenox  GI Prophylaxis: None     32 minutes of critical care was time spent personally by me on the following activities: development of treatment plan with patient or surrogate and bedside caregivers, discussions with consultants, evaluation of patient's response to treatment, examination of patient, ordering and performing treatments and interventions, ordering and review of laboratory studies, ordering and review of radiographic studies, pulse oximetry, re-evaluation of patient's condition.  This critical care time did not overlap with that of any other provider or involve " time for any procedures.     Remedios Carey MD, PGY-III  Pulmonary & Critical Care Medicine  [facility] - ICU

## 2024-07-14 LAB
ALBUMIN SERPL-MCNC: 3.3 G/DL (ref 3.5–5)
ALBUMIN/GLOB SERPL: 1.1 RATIO (ref 1.1–2)
ALP SERPL-CCNC: 65 UNIT/L (ref 40–150)
ALT SERPL-CCNC: 46 UNIT/L (ref 0–55)
ANION GAP SERPL CALC-SCNC: 8 MEQ/L
AST SERPL-CCNC: 18 UNIT/L (ref 5–34)
BASOPHILS # BLD AUTO: 0.03 X10(3)/MCL
BASOPHILS NFR BLD AUTO: 0.4 %
BILIRUB SERPL-MCNC: 0.4 MG/DL
BUN SERPL-MCNC: 9.1 MG/DL (ref 8.9–20.6)
CALCIUM SERPL-MCNC: 9.4 MG/DL (ref 8.4–10.2)
CHLORIDE SERPL-SCNC: 105 MMOL/L (ref 98–107)
CO2 SERPL-SCNC: 24 MMOL/L (ref 22–29)
CREAT SERPL-MCNC: 1.16 MG/DL (ref 0.73–1.18)
CREAT/UREA NIT SERPL: 8
EOSINOPHIL # BLD AUTO: 0.1 X10(3)/MCL (ref 0–0.9)
EOSINOPHIL NFR BLD AUTO: 1.5 %
ERYTHROCYTE [DISTWIDTH] IN BLOOD BY AUTOMATED COUNT: 14.5 % (ref 11.5–17)
GFR SERPLBLD CREATININE-BSD FMLA CKD-EPI: >60 ML/MIN/1.73/M2
GLOBULIN SER-MCNC: 3.1 GM/DL (ref 2.4–3.5)
GLUCOSE SERPL-MCNC: 130 MG/DL (ref 74–100)
HCT VFR BLD AUTO: 49.4 % (ref 42–52)
HGB BLD-MCNC: 15.6 G/DL (ref 14–18)
HOLD SPECIMEN: NORMAL
IMM GRANULOCYTES # BLD AUTO: 0.01 X10(3)/MCL (ref 0–0.04)
IMM GRANULOCYTES NFR BLD AUTO: 0.1 %
LYMPHOCYTES # BLD AUTO: 2.22 X10(3)/MCL (ref 0.6–4.6)
LYMPHOCYTES NFR BLD AUTO: 32.4 %
MCH RBC QN AUTO: 30.5 PG (ref 27–31)
MCHC RBC AUTO-ENTMCNC: 31.6 G/DL (ref 33–36)
MCV RBC AUTO: 96.7 FL (ref 80–94)
MONOCYTES # BLD AUTO: 0.94 X10(3)/MCL (ref 0.1–1.3)
MONOCYTES NFR BLD AUTO: 13.7 %
NEUTROPHILS # BLD AUTO: 3.56 X10(3)/MCL (ref 2.1–9.2)
NEUTROPHILS NFR BLD AUTO: 51.9 %
NRBC BLD AUTO-RTO: 0 %
PLATELET # BLD AUTO: 274 X10(3)/MCL (ref 130–400)
PMV BLD AUTO: 10.5 FL (ref 7.4–10.4)
POTASSIUM SERPL-SCNC: 3.8 MMOL/L (ref 3.5–5.1)
PROT SERPL-MCNC: 6.4 GM/DL (ref 6.4–8.3)
RBC # BLD AUTO: 5.11 X10(6)/MCL (ref 4.7–6.1)
SODIUM SERPL-SCNC: 137 MMOL/L (ref 136–145)
WBC # BLD AUTO: 6.86 X10(3)/MCL (ref 4.5–11.5)

## 2024-07-14 PROCEDURE — 25000003 PHARM REV CODE 250

## 2024-07-14 PROCEDURE — 36415 COLL VENOUS BLD VENIPUNCTURE: CPT

## 2024-07-14 PROCEDURE — 20000000 HC ICU ROOM

## 2024-07-14 PROCEDURE — 63600175 PHARM REV CODE 636 W HCPCS

## 2024-07-14 PROCEDURE — 80053 COMPREHEN METABOLIC PANEL: CPT

## 2024-07-14 PROCEDURE — 36415 COLL VENOUS BLD VENIPUNCTURE: CPT | Performed by: HOSPITALIST

## 2024-07-14 RX ADMIN — ENOXAPARIN SODIUM 105 MG: 120 INJECTION SUBCUTANEOUS at 08:07

## 2024-07-14 RX ADMIN — MUPIROCIN: 20 OINTMENT TOPICAL at 08:07

## 2024-07-14 RX ADMIN — METOPROLOL TARTRATE 50 MG: 50 TABLET, FILM COATED ORAL at 02:07

## 2024-07-14 RX ADMIN — HYDROXYZINE PAMOATE 25 MG: 25 CAPSULE ORAL at 03:07

## 2024-07-14 RX ADMIN — AMIODARONE HYDROCHLORIDE 0.5 MG/MIN: 1.8 INJECTION, SOLUTION INTRAVENOUS at 04:07

## 2024-07-14 RX ADMIN — CETIRIZINE HYDROCHLORIDE 10 MG: 10 TABLET, FILM COATED ORAL at 08:07

## 2024-07-14 RX ADMIN — METOPROLOL TARTRATE 50 MG: 50 TABLET, FILM COATED ORAL at 08:07

## 2024-07-14 RX ADMIN — AMIODARONE HYDROCHLORIDE 0.5 MG/MIN: 1.8 INJECTION, SOLUTION INTRAVENOUS at 03:07

## 2024-07-14 RX ADMIN — HYDROXYZINE PAMOATE 25 MG: 25 CAPSULE ORAL at 08:07

## 2024-07-14 NOTE — PLAN OF CARE
Problem: Adult Inpatient Plan of Care  Goal: Plan of Care Review  Outcome: Progressing  Goal: Absence of Hospital-Acquired Illness or Injury  Outcome: Progressing  Goal: Optimal Comfort and Wellbeing  Outcome: Progressing  Goal: Readiness for Transition of Care  Outcome: Progressing     Problem: Fall Injury Risk  Goal: Absence of Fall and Fall-Related Injury  Outcome: Progressing

## 2024-07-14 NOTE — PROGRESS NOTES
[facility] - ICU  Pulmonary Critical Care Note    Patient Name: Thad Payne Jr.  MRN: [id number]  Admission Date: 7/10/2024  Hospital Length of Stay: 4 days  Code Status: Full Code  Attending Provider: Geraldo Martinez MD  Primary Care Provider: Winter, Primary Doctor     Subjective:     HPI:   Thad Payne Jr. is a 43 y.o. male without any significant PMH, who presented to the ED on 7/10/2024 with CC of palpitations that started on 7/7/2024. He states that symptom was aggravated by physical activities and partially relieved by rest; he has never had this type of palpitation in the past; denies any radiating chest pain, shortness of breath, lightheadedness or recent illnesses. He does not take any prescribed meds but reported taking a new OTC male enhancement supplement intermittently for the past 3 weeks. His social history includes 1PPD since age 18, occasional alcohol use (2-4 drinks/day), no illicit drug use. In the ED: HR in the range of 130's-150's; he was given diltiazem 10mg IV x1, diltiazem 20mg IV x1, and lopressor 5mg x1 with brief response; troponin negative, TSH WNL, .6; EKG revealed A-fib without any obvious ischemic findings; CXR revealed no acute cardiopulmonary findings. He was initially admitted to hospital medicine unit on 7/10/2024 but was later upgraded to ICU level of care in the morning of 7/11/2024 for the initiation of amiodarone drip per hospital's protocol.     Hospital Course/Significant events:  7/10/2024 - Admitted to hospital medicine unit for A-fib w/ RVR  7/11/2024 - Upgraded to ICU level of care d/t refractory A-fib w/ RVR, requiring amiodarone drip. HR responded to esmolol, however BP did not tolerate continued dosing and it was stopped. Received digoxin 500mcg 7/12 without noticeable improvement in HR.   7/12 - some improvement with esmolol drip, BP unable to tolerate. No objective improvement with digoxin. Continues to be on amio drip.  7/13 - patient converted to NSR  @ 1206    24 Hour Interval History:  Converted to NSR yesterday around noon. Currently still on amio, started 7/11 @ 0800. EF 35-40% on admit echo.     History reviewed. No pertinent past medical history.    History reviewed. No pertinent surgical history.    Social History     Socioeconomic History    Marital status: Single   Tobacco Use    Smoking status: Every Day     Current packs/day: 0.50     Types: Cigarettes    Smokeless tobacco: Never   Substance and Sexual Activity    Alcohol use: Never    Drug use: Never     Social Determinants of Health     Financial Resource Strain: Low Risk  (7/12/2024)    Overall Financial Resource Strain (CARDIA)     Difficulty of Paying Living Expenses: Not hard at all   Food Insecurity: No Food Insecurity (7/12/2024)    Hunger Vital Sign     Worried About Running Out of Food in the Last Year: Never true     Ran Out of Food in the Last Year: Never true   Transportation Needs: No Transportation Needs (7/12/2024)    TRANSPORTATION NEEDS     Transportation : No   Stress: No Stress Concern Present (7/12/2024)    [place] [employer] - Occupational Stress Questionnaire     Feeling of Stress : Not at all   Housing Stability: Low Risk  (7/12/2024)    Housing Stability Vital Sign     Unable to Pay for Housing in the Last Year: No     Homeless in the Last Year: No       Current Outpatient Medications   Medication Instructions    diclofenac (VOLTAREN) 75 mg, Oral, 2 times daily PRN, Do not take this with Advil, Ibuprofen, Motrin, Asprin, or Toradol.     Current Inpatient Medications   cetirizine  10 mg Oral Daily    enoxparin  1 mg/kg Subcutaneous Q12H (treatment, non-standard time)    metoprolol tartrate  50 mg Oral TID    mupirocin   Nasal BID     Current Intravenous Infusions   amiodarone in dextrose 5%  0.5 mg/min Intravenous Continuous 16.7 mL/hr at 07/14/24 0725 0.5 mg/min at 07/14/24 0725    esmolol  0-300 mcg/kg/min Intravenous Continuous   Stopped at 07/12/24 4852      Review of Systems   Constitutional:  Negative for chills and fever.   Respiratory:  Negative for shortness of breath.    Cardiovascular:  Negative for chest pain and palpitations.        Objective:     Intake/Output Summary (Last 24 hours) at 7/14/2024 0842  Last data filed at 7/14/2024 0725  Gross per 24 hour   Intake 1121.42 ml   Output --   Net 1121.42 ml     Vital Signs (Most Recent):  Temp: 97.7 °F (36.5 °C) (07/14/24 0701)  Pulse: 72 (07/14/24 0400)  Resp: 14 (07/14/24 0400)  BP: 123/70 (07/14/24 0837)  SpO2: 100 % (07/14/24 0400)  Body mass index is 28.62 kg/m².  Weight: 103.9 kg (229 lb) Vital Signs (24h Range):  Temp:  [97.7 °F (36.5 °C)-99.1 °F (37.3 °C)] 97.7 °F (36.5 °C)  Pulse:  [] 72  Resp:  [12-28] 14  SpO2:  [95 %-100 %] 100 %  BP: ()/(59-86) 123/70     Physical Exam  Vitals reviewed.   Constitutional:       General: He is not in acute distress.  HENT:      Mouth/Throat:      Mouth: Mucous membranes are moist.   Eyes:      Conjunctiva/sclera: Conjunctivae normal.      Pupils: Pupils are equal, round, and reactive to light.   Cardiovascular:      Rate and Rhythm: Normal rate and regular rhythm.      Pulses: Normal pulses.      Heart sounds: Normal heart sounds.   Pulmonary:      Effort: Pulmonary effort is normal. No respiratory distress.      Breath sounds: Normal breath sounds.   Abdominal:      General: There is no distension.      Palpations: Abdomen is soft.   Musculoskeletal:         General: No tenderness.   Skin:     General: Skin is warm and dry.      Capillary Refill: Capillary refill takes less than 2 seconds.   Neurological:      General: No focal deficit present.      Mental Status: He is alert.         Lines/Drains/Airways       Peripheral Intravenous Line  Duration                  Peripheral IV - Single Lumen 07/14/24 0724 20 G Posterior;Right Hand <1 day                  Significant Labs:  Lab Results   Component Value Date    WBC 6.86 07/13/2024    HGB 15.6 07/13/2024  "   HCT 49.4 07/13/2024    MCV 96.7 (H) 07/13/2024     07/13/2024       BMP  Lab Results   Component Value Date     07/14/2024    K 3.8 07/14/2024    CO2 24 07/14/2024    BUN 9.1 07/14/2024    CREATININE 1.16 07/14/2024    CALCIUM 9.4 07/14/2024    AGAP 8.0 07/14/2024    EGFRNONAA 89 (L) 04/24/2020     ABG  No results for input(s): "PH", "PO2", "PCO2", "HCO3", "BE" in the last 168 hours.  Mechanical Ventilation Support:       Significant Imaging:  I have reviewed the pertinent imaging within the past 24 hours.    Assessment/Plan:     Assessment  Newly onset and refractory A-fib w/ RVR, requiring amiodarone infusion  No obvious signs of infection, low pre-test probability for PE (Wells' score of 1.5 indicating low risk), recent use of male sexual enhancing supplementation   CHADVASC score of 0  TSH 1.23  Troponin negative on admission  Tobacco abuse     Plan  -continue FD lovenox   -continue amio drip (started 7/11 @ 0800)  -continue metoprolol if HR and BP allows   -admit echo with EF 35-40% with reduced systolic function, likely secondary to tachycardia, avoid CCB  -holding esmolol drip as BP can no longer tolerate   -received 500mcg digoxin 7/12, consider repeating dosing  -cardiology consulted, pending d/c medication recs. if converts back to afib with RVR, increase metoprolol each dose until HR control or BP can no longer tolerate.  -A1c 5.7%, TSH/T4 wnl, electrolytes wnl, lipid panel unremarkable     DVT Prophylaxis: FD Lovenox  GI Prophylaxis: None     32 minutes of critical care was time spent personally by me on the following activities: development of treatment plan with patient or surrogate and bedside caregivers, discussions with consultants, evaluation of patient's response to treatment, examination of patient, ordering and performing treatments and interventions, ordering and review of laboratory studies, ordering and review of radiographic studies, pulse oximetry, re-evaluation of patient's " condition.  This critical care time did not overlap with that of any other provider or involve time for any procedures.     Remedios Carey MD, PGY-III  Pulmonary & Critical Care Medicine  [facility] - ICU

## 2024-07-15 LAB
ALBUMIN SERPL-MCNC: 3.2 G/DL (ref 3.5–5)
ALBUMIN/GLOB SERPL: 1 RATIO (ref 1.1–2)
ALP SERPL-CCNC: 58 UNIT/L (ref 40–150)
ALT SERPL-CCNC: 58 UNIT/L (ref 0–55)
ANION GAP SERPL CALC-SCNC: 6 MEQ/L
AST SERPL-CCNC: 31 UNIT/L (ref 5–34)
BASOPHILS # BLD AUTO: 0.03 X10(3)/MCL
BASOPHILS NFR BLD AUTO: 0.4 %
BILIRUB SERPL-MCNC: 0.4 MG/DL
BUN SERPL-MCNC: 8.3 MG/DL (ref 8.9–20.6)
CALCIUM SERPL-MCNC: 9.6 MG/DL (ref 8.4–10.2)
CHLORIDE SERPL-SCNC: 107 MMOL/L (ref 98–107)
CO2 SERPL-SCNC: 24 MMOL/L (ref 22–29)
CREAT SERPL-MCNC: 1.04 MG/DL (ref 0.73–1.18)
CREAT/UREA NIT SERPL: 8
EOSINOPHIL # BLD AUTO: 0.09 X10(3)/MCL (ref 0–0.9)
EOSINOPHIL NFR BLD AUTO: 1.3 %
ERYTHROCYTE [DISTWIDTH] IN BLOOD BY AUTOMATED COUNT: 13.6 % (ref 11.5–17)
GFR SERPLBLD CREATININE-BSD FMLA CKD-EPI: >60 ML/MIN/1.73/M2
GLOBULIN SER-MCNC: 3.2 GM/DL (ref 2.4–3.5)
GLUCOSE SERPL-MCNC: 106 MG/DL (ref 74–100)
HCT VFR BLD AUTO: 44.5 % (ref 42–52)
HGB BLD-MCNC: 14.8 G/DL (ref 14–18)
IMM GRANULOCYTES # BLD AUTO: 0.02 X10(3)/MCL (ref 0–0.04)
IMM GRANULOCYTES NFR BLD AUTO: 0.3 %
LYMPHOCYTES # BLD AUTO: 2.23 X10(3)/MCL (ref 0.6–4.6)
LYMPHOCYTES NFR BLD AUTO: 31.5 %
MCH RBC QN AUTO: 30.1 PG (ref 27–31)
MCHC RBC AUTO-ENTMCNC: 33.3 G/DL (ref 33–36)
MCV RBC AUTO: 90.6 FL (ref 80–94)
MONOCYTES # BLD AUTO: 1.14 X10(3)/MCL (ref 0.1–1.3)
MONOCYTES NFR BLD AUTO: 16.1 %
NEUTROPHILS # BLD AUTO: 3.58 X10(3)/MCL (ref 2.1–9.2)
NEUTROPHILS NFR BLD AUTO: 50.4 %
NRBC BLD AUTO-RTO: 0 %
OHS QRS DURATION: 92 MS
OHS QTC CALCULATION: 485 MS
PLATELET # BLD AUTO: 252 X10(3)/MCL (ref 130–400)
PMV BLD AUTO: 9 FL (ref 7.4–10.4)
POTASSIUM SERPL-SCNC: 4.1 MMOL/L (ref 3.5–5.1)
PROT SERPL-MCNC: 6.4 GM/DL (ref 6.4–8.3)
RBC # BLD AUTO: 4.91 X10(6)/MCL (ref 4.7–6.1)
SODIUM SERPL-SCNC: 137 MMOL/L (ref 136–145)
WBC # BLD AUTO: 7.09 X10(3)/MCL (ref 4.5–11.5)

## 2024-07-15 PROCEDURE — 85025 COMPLETE CBC W/AUTO DIFF WBC: CPT

## 2024-07-15 PROCEDURE — 11000001 HC ACUTE MED/SURG PRIVATE ROOM

## 2024-07-15 PROCEDURE — 80053 COMPREHEN METABOLIC PANEL: CPT

## 2024-07-15 PROCEDURE — 25000003 PHARM REV CODE 250

## 2024-07-15 PROCEDURE — 63600175 PHARM REV CODE 636 W HCPCS

## 2024-07-15 PROCEDURE — 99232 SBSQ HOSP IP/OBS MODERATE 35: CPT | Mod: ,,, | Performed by: INTERNAL MEDICINE

## 2024-07-15 PROCEDURE — 36415 COLL VENOUS BLD VENIPUNCTURE: CPT

## 2024-07-15 PROCEDURE — 25000003 PHARM REV CODE 250: Performed by: HOSPITALIST

## 2024-07-15 PROCEDURE — 21400001 HC TELEMETRY ROOM

## 2024-07-15 RX ORDER — AMIODARONE HYDROCHLORIDE 200 MG/1
400 TABLET ORAL DAILY
Status: DISCONTINUED | OUTPATIENT
Start: 2024-07-15 | End: 2024-07-16 | Stop reason: HOSPADM

## 2024-07-15 RX ORDER — METOPROLOL SUCCINATE 50 MG/1
50 TABLET, EXTENDED RELEASE ORAL 2 TIMES DAILY
Status: DISCONTINUED | OUTPATIENT
Start: 2024-07-15 | End: 2024-07-16 | Stop reason: HOSPADM

## 2024-07-15 RX ADMIN — HYDROXYZINE PAMOATE 25 MG: 25 CAPSULE ORAL at 12:07

## 2024-07-15 RX ADMIN — HYDROXYZINE PAMOATE 25 MG: 25 CAPSULE ORAL at 08:07

## 2024-07-15 RX ADMIN — METOPROLOL SUCCINATE 50 MG: 50 TABLET, EXTENDED RELEASE ORAL at 09:07

## 2024-07-15 RX ADMIN — CETIRIZINE HYDROCHLORIDE 10 MG: 10 TABLET, FILM COATED ORAL at 09:07

## 2024-07-15 RX ADMIN — APIXABAN 5 MG: 2.5 TABLET, FILM COATED ORAL at 08:07

## 2024-07-15 RX ADMIN — MUPIROCIN: 20 OINTMENT TOPICAL at 08:07

## 2024-07-15 RX ADMIN — AMIODARONE HYDROCHLORIDE 400 MG: 200 TABLET ORAL at 09:07

## 2024-07-15 RX ADMIN — AMIODARONE HYDROCHLORIDE 0.5 MG/MIN: 1.8 INJECTION, SOLUTION INTRAVENOUS at 04:07

## 2024-07-15 RX ADMIN — APIXABAN 5 MG: 2.5 TABLET, FILM COATED ORAL at 09:07

## 2024-07-15 RX ADMIN — MUPIROCIN: 20 OINTMENT TOPICAL at 09:07

## 2024-07-15 NOTE — PROGRESS NOTES
Inpatient Nutrition Assessment    Admit Date: 7/10/2024   Total duration of encounter: 5 days   Patient Age: 43 y.o.    Nutrition Recommendation/Prescription     Continue heart healthy diet  Pt education on diet complete  MVI/fe  Biweekly wt  Will monitor nutrition status     Communication of Recommendations: reviewed with nurse and reviewed with patient    Nutrition Assessment     Malnutrition Assessment/Nutrition-Focused Physical Exam    Malnutrition Context: acute illness or injury (07/15/24 1234)  Malnutrition Level: other (see comments) (pt does not meet malnutrition criteria) (07/15/24 1234)  Energy Intake (Malnutrition): other (see comments) (does not meet criteria) (07/15/24 1234)  Weight Loss (Malnutrition):  (does not meet criteria) (07/15/24 1234)     Orbital Region (Subcutaneous Fat Loss): well nourished  Upper Arm Region (Subcutaneous Fat Loss): well nourished        [place] Region (Muscle Loss): well nourished  Clavicle Bone Region (Muscle Loss): well nourished                             A minimum of two characteristics is recommended for diagnosis of either severe or non-severe malnutrition.    Chart Review    Reason Seen: length of stay    Malnutrition Screening Tool Results   Have you recently lost weight without trying?: No  Have you been eating poorly because of a decreased appetite?: No   MST Score: 0   Diagnosis:  Afib/RVR, tobacco abuse     Relevant Medical History: none    Scheduled Medications:  amiodarone, 400 mg, Daily  apixaban, 5 mg, BID  cetirizine, 10 mg, Daily  metoprolol succinate, 50 mg, BID  mupirocin, , BID    Continuous Infusions:   PRN Medications:  dextrose 10%, 12.5 g, PRN  dextrose 10%, 25 g, PRN  glucagon (human recombinant), 1 mg, PRN  glucose, 16 g, PRN  glucose, 24 g, PRN  hydrOXYzine pamoate, 25 mg, Q8H PRN  naloxone, 0.02 mg, PRN  nicotine, 1 patch, Daily PRN  sodium chloride 0.9%, 10 mL, Q12H PRN    Calorie Containing IV Medications: no significant kcals from  "medications at this time    Recent Labs   Lab 07/10/24  1123 07/11/24  0239 07/12/24  0353 07/13/24  0315 07/13/24  0406 07/14/24  0430 07/15/24  0438    139 140  --   --  137 137   K 4.4 4.1 4.0  --   --  3.8 4.1   CALCIUM 9.9 9.2 9.8  --   --  9.4 9.6   PHOS  --  3.8 5.0* 4.9*  --   --   --    MG  --  2.10 2.30 2.20  --   --   --    CO2 26 22 26  --   --  24 24   BUN 10.8 10.1 9.1  --   --  9.1 8.3*   CREATININE 1.11 0.98 1.19*  --   --  1.16 1.04   EGFRNORACEVR >60 >60 >60  --   --  >60 >60   GLUCOSE 110* 123* 110*  --   --  130* 106*   BILITOT 0.5 0.5  --   --   --  0.4 0.4   ALKPHOS 61 57  --   --   --  65 58   ALT 51 55  --   --   --  46 58*   AST 26 32  --   --   --  18 31   ALBUMIN 3.8 3.4*  --   --   --  3.3* 3.2*   TRIG  --  148*  --   --   --   --   --    HGBA1C  --   --  5.7  --   --   --   --    WBC 6.31 5.62 6.13  --  6.86  --  7.09   HGB 15.9 14.8 15.0  --  15.6  --  14.8   HCT 48.5 44.2 45.7  --  49.4  --  44.5     Nutrition Orders:  Diet Heart Healthy      Appetite/Oral Intake: good/% of meals  Factors Affecting Nutritional Intake: none identified  Social Needs Impacting Access to Food: none identified  Food/Hinduism/Cultural Preferences: none reported  Food Allergies: none reported  Last Bowel Movement: 07/13/24  Wound(s):  none    Comments    (7/15) Pt reported good appetite; eating % meals; bedwt on rounds 253#; #; stable. Reviewed heart healthy/card diet.     Anthropometrics    Height: 6' 3" (190.5 cm), Height Method: Stated  Last Weight: 115.3 kg (254 lb 3.1 oz) (bed wt on rounds) (07/15/24 1231), Weight Method: Standard Scale  BMI (Calculated): 31.8  BMI Classification: obese grade I (BMI 30-34.9)        Ideal Body Weight (IBW), Male: 196 lb     % Ideal Body Weight, Male (lb): 116.84 %                          Usual Weight Provided By: patient and EMR weight history    Wt Readings from Last 5 Encounters:   07/15/24 115.3 kg (254 lb 3.1 oz)   07/18/22 115.8 kg (255 lb " 4.7 oz)     Weight Change(s) Since Admission: #  Wt Readings from Last 1 Encounters:   07/15/24 1231 115.3 kg (254 lb 3.1 oz)   07/11/24 0742 103.9 kg (229 lb)   07/10/24 1031 114.1 kg (251 lb 10.5 oz)   Admit Weight: 114.1 kg (251 lb 10.5 oz) (07/10/24 1031), Weight Method: Standard Scale    Estimated Needs    Weight Used For Calorie Calculations: 115.3 kg (254 lb 3.1 oz)  Energy Calorie Requirements (kcal): 2306 kcal/d; 20 marysol/kg  Energy Need Method: Kcal/kg  Weight Used For Protein Calculations: 89 kg (196 lb 3.4 oz) (IBW used protein needs)  Protein Requirements: 115 gm protein/d; 1.3 gm/kg  Fluid Requirements (mL): 2306 ml/d; 1ml/marysol        Enteral Nutrition     Patient not receiving enteral nutrition at this time.    Parenteral Nutrition     Patient not receiving parenteral nutrition support at this time.    Evaluation of Received Nutrient Intake    Calories: meeting estimated needs  Protein: meeting estimated needs    Patient Education     Education Provided: heart healthy diet  Teaching Method: explanation and printed materials  Comprehension: verbalizes understanding  Barriers to Learning: none evident  Expected Compliance: fair  Comments: All questions were answered and dietitian's contact information was provided.     Nutrition Diagnosis     PES: Food and nutrition related knowledge deficit related to lack of prior nutrition-related education as evidenced by verbalized limited understanding of diet . (new)       Nutrition Interventions     Intervention(s): modified composition of meals/snacks, multivitamin/mineral supplement therapy, purpose of nutrition education, and collaboration with other providers    Goal: Meet greater than 80% of nutritional needs by follow-up. (new)  Goal: Verbalize understanding of diet by discharge. (new)    Nutrition Goals & Monitoring     Dietitian will monitor: food and beverage intake, weight, food/nutrition knowledge skill, and glucose/endocrine profile  Discharge  planning: continue heart healhty  diet  Nutrition Risk/Follow-Up: low (follow-up in 5-7 days)   Please consult if re-assessment needed sooner.

## 2024-07-15 NOTE — PROGRESS NOTES
"Cardiology Progress Note       Cardiology Attending: Dr. Alvaro Padilla   Cardiology Fellow: Pattie Hunt DO     Date of Admit: 7/10/2024  Date of Note: 7/15/2024    Subjective:      Pt seen and examined at bedside this evening. He reports he is doing well this evening. We discussed long-term management and AC today and patient is in agreement. Discussed EtOH cessation.      Objective:      24-hour Vitals:   Temp:  [98 °F (36.7 °C)-98.6 °F (37 °C)] 98 °F (36.7 °C)  Pulse:  [55-73] 64  Resp:  [12-26] 17  SpO2:  [97 %-100 %] 98 %  BP: ()/(60-76) 101/62    Vitals: /62   Pulse 64   Temp 98 °F (36.7 °C) (Oral)   Resp 17   Ht 6' 3" (1.905 m)   Wt 103.9 kg (229 lb)   SpO2 98%   BMI 28.62 kg/m²     Intake/Output Summary (Last 24 hours) at 7/15/2024 1048  Last data filed at 7/14/2024 1324  Gross per 24 hour   Intake 240 ml   Output --   Net 240 ml       Physical Exam   Current Medications:      Infusions:   amiodarone in dextrose 5%  0.5 mg/min Intravenous Continuous 16.7 mL/hr at 07/15/24 0401 0.5 mg/min at 07/15/24 0401     Scheduled:   amiodarone  400 mg Oral Daily    apixaban  5 mg Oral BID    cetirizine  10 mg Oral Daily    metoprolol succinate  50 mg Oral BID    mupirocin   Nasal BID     PRN:    Current Facility-Administered Medications:     dextrose 10%, 12.5 g, Intravenous, PRN    dextrose 10%, 25 g, Intravenous, PRN    glucagon (human recombinant), 1 mg, Intramuscular, PRN    glucose, 16 g, Oral, PRN    glucose, 24 g, Oral, PRN    hydrOXYzine pamoate, 25 mg, Oral, Q8H PRN    naloxone, 0.02 mg, Intravenous, PRN    nicotine, 1 patch, Transdermal, Daily PRN    sodium chloride 0.9%, 10 mL, Intravenous, Q12H PRN    Labs:   I have reviewed the following labs below:    Recent Labs   Lab 07/10/24  1123 07/11/24  0239 07/12/24  0353 07/13/24  0315 07/13/24  0406 07/14/24  0430 07/15/24  0438   WBC 6.31 5.62 6.13  --  6.86  --  7.09   HGB 15.9 14.8 15.0  --  15.6  --  14.8   HCT 48.5 44.2 45.7  --  " 49.4  --  44.5    246 259  --  274  --  252    139 140  --   --  137 137   K 4.4 4.1 4.0  --   --  3.8 4.1    109* 104  --   --  105 107   CO2 26 22 26  --   --  24 24   BUN 10.8 10.1 9.1  --   --  9.1 8.3*   CREATININE 1.11 0.98 1.19*  --   --  1.16 1.04   CALCIUM 9.9 9.2 9.8  --   --  9.4 9.6   MG  --  2.10 2.30 2.20  --   --   --    PHOS  --  3.8 5.0* 4.9*  --   --   --    ALBUMIN 3.8 3.4*  --   --   --  3.3* 3.2*   BILITOT 0.5 0.5  --   --   --  0.4 0.4   AST 26 32  --   --   --  18 31   ALT 51 55  --   --   --  46 58*   ALKPHOS 61 57  --   --   --  65 58   INR  --  1.0  --   --   --   --   --    TROPONINI <0.010  --   --   --   --   --   --    .6*  --   --   --   --   --   --      Cardiovascular Studies:     TTE (7/11/2024):    Atrial fibrillation is observed.    Left Ventricle: The left ventricle is normal in size. Normal wall thickness. There is moderately reduced systolic function with a visually estimated ejection fraction of 35 - 40%.    Right Ventricle: Normal right ventricular cavity size. Systolic function is reduced.    Left Atrium: Left atrium is mildly dilated.    Aortic Valve: The aortic valve is a trileaflet valve. There is no stenosis. Aortic valve peak velocity is 1.14 m/s. Mean gradient is 3 mmHg.    Mitral Valve: The mitral valve is structurally normal. There is normal leaflet mobility. There is mild regurgitation.    Tricuspid Valve: There is mild regurgitation.    Pulmonary Artery: The estimated pulmonary artery systolic pressure is 23 mmHg.    IVC/SVC: Normal venous pressure at 3 mmHg.           Assessment:     Thad Payne Jr. is a 43 y.o. male with a PMHx of ETOH use presenting with new onset atrial fibrillation with rapid ventricular response (likely due to ETOH use) in ICU on amiodarone infusion with uncontrolled rates.     Plan/Recommendations:     Atrial Fibrillation  Patient initially presented with Afib RVR. He has no prior history of Afib. Rates were  difficult to control and he was ultimately put in the unit for amiodarone protocol. Patient also started in esmolol gtt and given digoxin load. He remains on PO amiodarone and Toprol.    - RENA-AF on No Anticoagulant    - All-Cause mortality: 1.5% (6 m), 2.7% (1 yr), 4.8% (2 yr)   - Ischemic Stroke/SE: 0.2% (6 m), 0.4% (1 yr), 0.6% (2 yr)   - Major Bleedin.1% (6 m), 0.1% (1 yr), 0.2% (2 yr)    - RENA-AF on DOAC   - All-Cause mortality: 1.0% (6 months), 0.1% (1 year), 0.1% (2 years)   - Ischemic Stroke/SE: 1.8% (6 months), 0.2% (1 year), 0.2% (2 years)   - Major Bleeding: 3.2% (6 months), 0.3% (1 year), 0.3% (2 years)    - recommend continuing Toprol 50mg BID and Amiodarone 400mg daily    - would avoid additional AV jhonatan blockade (including digoxin) at his time    - Will need close cardiology follow-up for medication management   - will eventually need to transition to other anti-arrhythmic drugs as amiodarone is not an ideal medication for a patient in their 40s     Thank you for allowing us to participate in the care of this patient. We will sign off for now. Please feel free to re-consult should the need arise.     Pattie Hunt DO  [facility] Cardiology Fellow  07/15/2024 10:48 AM

## 2024-07-15 NOTE — PROGRESS NOTES
[facility] - ICU  Pulmonary Critical Care Note    Patient Name: Thad Payne Jr.  MRN: [id number]  Admission Date: 7/10/2024  Hospital Length of Stay: 5 days  Code Status: Full Code  Attending Provider: Geraldo Martinez MD  Primary Care Provider: Winter, Primary Doctor     Subjective:     HPI:   Thad Payne Jr. is a 43 y.o. male without any significant PMH, who presented to the ED on 7/10/2024 with CC of palpitations that started on 7/7/2024. He states that symptom was aggravated by physical activities and partially relieved by rest; he has never had this type of palpitation in the past; denies any radiating chest pain, shortness of breath, lightheadedness or recent illnesses. He does not take any prescribed meds but reported taking a new OTC male enhancement supplement intermittently for the past 3 weeks. His social history includes 1PPD since age 18, occasional alcohol use (2-4 drinks/day), no illicit drug use. In the ED: HR in the range of 130's-150's; he was given diltiazem 10mg IV x1, diltiazem 20mg IV x1, and lopressor 5mg x1 with brief response; troponin negative, TSH WNL, .6; EKG revealed A-fib without any obvious ischemic findings; CXR revealed no acute cardiopulmonary findings. He was initially admitted to hospital medicine unit on 7/10/2024 but was later upgraded to ICU level of care in the morning of 7/11/2024 for the initiation of amiodarone drip per hospital's protocol.     Hospital Course/Significant events:  7/10/2024 - Admitted to hospital medicine unit for A-fib w/ RVR  7/11/2024 - Upgraded to ICU level of care d/t refractory A-fib w/ RVR, requiring amiodarone drip. HR responded to esmolol, however BP did not tolerate continued dosing and it was stopped. Received digoxin 500mcg 7/12 without noticeable improvement in HR.   7/12 - some improvement with esmolol drip, BP unable to tolerate. No objective improvement with digoxin. Continues to be on amio drip.  7/13 - patient converted to NSR  @ 1206    24 Hour Interval History:   NAEO. Continued NSR since 7/13. Currently still on amio, started 7/11 @ 0800. EF 35-40% on admit echo. Patient feels well. Heart rate is on the lower end ranging in the high 50s to 60s. Patient asymptomatic. No acute complaints at this time. Denied any fevers, chills, n/v, CP, or SOB.    History reviewed. No pertinent past medical history.    History reviewed. No pertinent surgical history.    Social History     Socioeconomic History    Marital status: Single   Tobacco Use    Smoking status: Every Day     Current packs/day: 0.50     Types: Cigarettes    Smokeless tobacco: Never   Substance and Sexual Activity    Alcohol use: Never    Drug use: Never     Social Determinants of Health     Financial Resource Strain: Low Risk  (7/12/2024)    Overall Financial Resource Strain (CARDIA)     Difficulty of Paying Living Expenses: Not hard at all   Food Insecurity: No Food Insecurity (7/12/2024)    Hunger Vital Sign     Worried About Running Out of Food in the Last Year: Never true     Ran Out of Food in the Last Year: Never true   Transportation Needs: No Transportation Needs (7/12/2024)    TRANSPORTATION NEEDS     Transportation : No   Stress: No Stress Concern Present (7/12/2024)    [employer] [place] [employer] - Occupational Stress Questionnaire     Feeling of Stress : Not at all   Housing Stability: Low Risk  (7/12/2024)    Housing Stability Vital Sign     Unable to Pay for Housing in the Last Year: No     Homeless in the Last Year: No       Current Outpatient Medications   Medication Instructions    diclofenac (VOLTAREN) 75 mg, Oral, 2 times daily PRN, Do not take this with Advil, Ibuprofen, Motrin, Asprin, or Toradol.     Current Inpatient Medications   cetirizine  10 mg Oral Daily    enoxparin  1 mg/kg Subcutaneous Q12H (treatment, non-standard time)    metoprolol tartrate  50 mg Oral TID    mupirocin   Nasal BID     Current Intravenous Infusions   amiodarone in  dextrose 5%  0.5 mg/min Intravenous Continuous 16.7 mL/hr at 07/15/24 0401 0.5 mg/min at 07/15/24 0401    esmolol  0-300 mcg/kg/min Intravenous Continuous   Stopped at 07/12/24 1415     Review of Systems   Constitutional:  Negative for chills and fever.   Respiratory:  Negative for shortness of breath.    Cardiovascular:  Negative for chest pain and palpitations.        Objective:     Intake/Output Summary (Last 24 hours) at 7/15/2024 0542  Last data filed at 7/14/2024 1324  Gross per 24 hour   Intake 710.16 ml   Output --   Net 710.16 ml     Vital Signs (Most Recent):  Temp: 98.6 °F (37 °C) (07/15/24 0358)  Pulse: 62 (07/15/24 0358)  Resp: 17 (07/15/24 0358)  BP: 107/69 (07/15/24 0358)  SpO2: 100 % (07/15/24 0358)  Body mass index is 28.62 kg/m².  Weight: 103.9 kg (229 lb) Vital Signs (24h Range):  Temp:  [97.7 °F (36.5 °C)-98.6 °F (37 °C)] 98.6 °F (37 °C)  Pulse:  [56-73] 62  Resp:  [12-26] 17  SpO2:  [97 %-100 %] 100 %  BP: ()/(62-76) 107/69     Physical Exam  Vitals reviewed.   Constitutional:       General: He is not in acute distress.  HENT:      Mouth/Throat:      Mouth: Mucous membranes are moist.   Eyes:      Conjunctiva/sclera: Conjunctivae normal.      Pupils: Pupils are equal, round, and reactive to light.   Cardiovascular:      Rate and Rhythm: Normal rate and regular rhythm.      Pulses: Normal pulses.      Heart sounds: Normal heart sounds.   Pulmonary:      Effort: Pulmonary effort is normal. No respiratory distress.      Breath sounds: Normal breath sounds.   Abdominal:      General: There is no distension.      Palpations: Abdomen is soft.   Musculoskeletal:         General: No tenderness.   Skin:     General: Skin is warm and dry.      Capillary Refill: Capillary refill takes less than 2 seconds.   Neurological:      General: No focal deficit present.      Mental Status: He is alert.         Lines/Drains/Airways       Peripheral Intravenous Line  Duration                  Peripheral IV -  "Single Lumen 07/14/24 0724 20 G Posterior;Right Hand <1 day                  Significant Labs:  Lab Results   Component Value Date    WBC 6.86 07/13/2024    HGB 15.6 07/13/2024    HCT 49.4 07/13/2024    MCV 96.7 (H) 07/13/2024     07/13/2024       BMP  Lab Results   Component Value Date     07/15/2024    K 4.1 07/15/2024    CO2 24 07/15/2024    BUN 8.3 (L) 07/15/2024    CREATININE 1.04 07/15/2024    CALCIUM 9.6 07/15/2024    AGAP 6.0 07/15/2024    EGFRNONAA 89 (L) 04/24/2020     ABG  No results for input(s): "PH", "PO2", "PCO2", "HCO3", "BE" in the last 168 hours.  Mechanical Ventilation Support:       Significant Imaging:  I have reviewed the pertinent imaging within the past 24 hours.    Assessment/Plan:     Assessment  Newly onset and refractory A-fib w/ RVR, requiring amiodarone infusion  No obvious signs of infection, low pre-test probability for PE (Wells' score of 1.5 indicating low risk), recent use of male sexual enhancing supplementation   CHADVASC score of 0  TSH 1.23  Troponin negative on admission  Tobacco abuse     Plan  -Will transition patient from full dose Lovenox to Eliquis  -Will transition patient to po amiodarone 400mg daily, will overlap for few hours with amio drip and discontinue drip at 1200  -continue metoprolol if HR and BP allows   -admit echo with EF 35-40% with reduced systolic function, likely secondary to tachycardia, avoid CCB  -holding esmolol drip as BP can no longer tolerate   -received 500mcg digoxin 7/12, consider repeating dosing  -cardiology consulted, pending d/c medication recs. if converts back to afib with RVR, increase metoprolol each dose until HR control or BP can no longer tolerate.  - will attempt transition to metoprolol succinate BID from metoprolol tartrate per cardiology recommendations   -A1c 5.7%, TSH/T4 wnl, electrolytes wnl, lipid panel unremarkable   - Downgrade to floor    DVT Prophylaxis: FD Lovenox  GI Prophylaxis: None     32 minutes of " critical care was time spent personally by me on the following activities: development of treatment plan with patient or surrogate and bedside caregivers, discussions with consultants, evaluation of patient's response to treatment, examination of patient, ordering and performing treatments and interventions, ordering and review of laboratory studies, ordering and review of radiographic studies, pulse oximetry, re-evaluation of patient's condition.  This critical care time did not overlap with that of any other provider or involve time for any procedures.     Eduard Mena MD, PGY-II  Pulmonary & Critical Care Medicine  [facility]

## 2024-07-16 VITALS
HEART RATE: 67 BPM | RESPIRATION RATE: 20 BRPM | TEMPERATURE: 98 F | OXYGEN SATURATION: 99 % | SYSTOLIC BLOOD PRESSURE: 117 MMHG | WEIGHT: 254.19 LBS | BODY MASS INDEX: 31.61 KG/M2 | DIASTOLIC BLOOD PRESSURE: 80 MMHG | HEIGHT: 75 IN

## 2024-07-16 LAB
ALBUMIN SERPL-MCNC: 3.3 G/DL (ref 3.5–5)
ALBUMIN/GLOB SERPL: 1 RATIO (ref 1.1–2)
ALP SERPL-CCNC: 56 UNIT/L (ref 40–150)
ALT SERPL-CCNC: 66 UNIT/L (ref 0–55)
ANION GAP SERPL CALC-SCNC: 9 MEQ/L
AST SERPL-CCNC: 31 UNIT/L (ref 5–34)
BASOPHILS # BLD AUTO: 0.03 X10(3)/MCL
BASOPHILS NFR BLD AUTO: 0.5 %
BILIRUB SERPL-MCNC: 0.5 MG/DL
BUN SERPL-MCNC: 8.6 MG/DL (ref 8.9–20.6)
CALCIUM SERPL-MCNC: 9.8 MG/DL (ref 8.4–10.2)
CHLORIDE SERPL-SCNC: 104 MMOL/L (ref 98–107)
CO2 SERPL-SCNC: 25 MMOL/L (ref 22–29)
CREAT SERPL-MCNC: 1.11 MG/DL (ref 0.73–1.18)
CREAT/UREA NIT SERPL: 8
EOSINOPHIL # BLD AUTO: 0.13 X10(3)/MCL (ref 0–0.9)
EOSINOPHIL NFR BLD AUTO: 2 %
ERYTHROCYTE [DISTWIDTH] IN BLOOD BY AUTOMATED COUNT: 13.6 % (ref 11.5–17)
GFR SERPLBLD CREATININE-BSD FMLA CKD-EPI: >60 ML/MIN/1.73/M2
GLOBULIN SER-MCNC: 3.4 GM/DL (ref 2.4–3.5)
GLUCOSE SERPL-MCNC: 113 MG/DL (ref 74–100)
HCT VFR BLD AUTO: 45 % (ref 42–52)
HGB BLD-MCNC: 14.7 G/DL (ref 14–18)
IMM GRANULOCYTES # BLD AUTO: 0.03 X10(3)/MCL (ref 0–0.04)
IMM GRANULOCYTES NFR BLD AUTO: 0.5 %
LYMPHOCYTES # BLD AUTO: 1.76 X10(3)/MCL (ref 0.6–4.6)
LYMPHOCYTES NFR BLD AUTO: 27 %
MCH RBC QN AUTO: 30.1 PG (ref 27–31)
MCHC RBC AUTO-ENTMCNC: 32.7 G/DL (ref 33–36)
MCV RBC AUTO: 92.2 FL (ref 80–94)
MONOCYTES # BLD AUTO: 1.11 X10(3)/MCL (ref 0.1–1.3)
MONOCYTES NFR BLD AUTO: 17 %
NEUTROPHILS # BLD AUTO: 3.47 X10(3)/MCL (ref 2.1–9.2)
NEUTROPHILS NFR BLD AUTO: 53 %
NRBC BLD AUTO-RTO: 0 %
PLATELET # BLD AUTO: 256 X10(3)/MCL (ref 130–400)
PMV BLD AUTO: 9.2 FL (ref 7.4–10.4)
POTASSIUM SERPL-SCNC: 4.4 MMOL/L (ref 3.5–5.1)
PROT SERPL-MCNC: 6.7 GM/DL (ref 6.4–8.3)
RBC # BLD AUTO: 4.88 X10(6)/MCL (ref 4.7–6.1)
SODIUM SERPL-SCNC: 138 MMOL/L (ref 136–145)
WBC # BLD AUTO: 6.53 X10(3)/MCL (ref 4.5–11.5)

## 2024-07-16 PROCEDURE — 85025 COMPLETE CBC W/AUTO DIFF WBC: CPT

## 2024-07-16 PROCEDURE — 80053 COMPREHEN METABOLIC PANEL: CPT

## 2024-07-16 PROCEDURE — 25000003 PHARM REV CODE 250

## 2024-07-16 PROCEDURE — 36415 COLL VENOUS BLD VENIPUNCTURE: CPT

## 2024-07-16 RX ORDER — METOPROLOL SUCCINATE 50 MG/1
50 TABLET, EXTENDED RELEASE ORAL 2 TIMES DAILY
Qty: 180 TABLET | Refills: 3 | Status: SHIPPED | OUTPATIENT
Start: 2024-07-16 | End: 2025-07-16

## 2024-07-16 RX ORDER — AMIODARONE HYDROCHLORIDE 400 MG/1
400 TABLET ORAL DAILY
Qty: 30 TABLET | Refills: 11 | Status: SHIPPED | OUTPATIENT
Start: 2024-07-17 | End: 2024-07-25 | Stop reason: ALTCHOICE

## 2024-07-16 RX ORDER — HYDROXYZINE PAMOATE 25 MG/1
25 CAPSULE ORAL EVERY 8 HOURS PRN
Qty: 90 CAPSULE | Refills: 0 | Status: SHIPPED | OUTPATIENT
Start: 2024-07-16 | End: 2024-08-15

## 2024-07-16 RX ADMIN — APIXABAN 5 MG: 2.5 TABLET, FILM COATED ORAL at 09:07

## 2024-07-16 RX ADMIN — CETIRIZINE HYDROCHLORIDE 10 MG: 10 TABLET, FILM COATED ORAL at 09:07

## 2024-07-16 RX ADMIN — METOPROLOL SUCCINATE 50 MG: 50 TABLET, EXTENDED RELEASE ORAL at 09:07

## 2024-07-16 RX ADMIN — AMIODARONE HYDROCHLORIDE 400 MG: 200 TABLET ORAL at 09:07

## 2024-07-16 NOTE — DISCHARGE SUMMARY
[facility] Medicine Discharge Summary    Admitting Physician: Venkat Huertas MD  Attending Physician: Geraldo Martinez MD  Date of Admit: 7/10/2024  Date of Discharge: 7/16/2024    Condition: Stable  Outcome: Patient tolerated treatment/procedure well without complication and is now ready for discharge.  DISPOSITION: Home or Self Care        Discharge Diagnoses:     Patient Active Problem List   Diagnosis    Obesity    Tobacco dependency    Atrial fibrillation with RVR       Principal Problem:  Atrial fibrillation with RVR    Consultants and Procedures:     Consultants:  IP CONSULT TO INTERNAL MEDICINE  IP CONSULT TO CARDIOLOGY    Procedures:   * No surgery found *      Brief Admission History:      Thad Payne Jr. is a 43 y.o. male without any significant PMH, who presented to the ED on 7/10/2024 with CC of palpitations that started on 7/7/2024. He states that symptom was aggravated by physical activities and partially relieved by rest; he has never had this type of palpitation in the past; denies any radiating chest pain, shortness of breath, lightheadedness or recent illnesses. He does not take any prescribed meds but reported taking a new OTC male enhancement supplement intermittently for the past 3 weeks. His social history includes 1PPD since age 18, occasional alcohol use (2-4 drinks/day), no illicit drug use. In the ED: HR in the range of 130's-150's; he was given diltiazem 10mg IV x1, diltiazem 20mg IV x1, and lopressor 5mg x1 with brief response; troponin negative, TSH WNL, .6; EKG revealed A-fib without any obvious ischemic findings; CXR revealed no acute cardiopulmonary findings. He was initially admitted to hospital medicine unit on 7/10/2024 but was later upgraded to ICU level of care in the morning of 7/11/2024 for the initiation of amiodarone drip per hospital's protocol.      Hospital Course/Significant events:  7/10/2024 - Admitted to hospital medicine unit for A-fib w/ RVR  7/11/2024  "- Upgraded to ICU level of care d/t refractory A-fib w/ RVR, requiring amiodarone drip. HR responded to esmolol, however BP did not tolerate continued dosing and it was stopped. Received digoxin 500mcg 7/12 without noticeable improvement in HR.   7/12 - some improvement with esmolol drip, BP unable to tolerate. No objective improvement with digoxin. Continues to be on amio drip.  7/13 - patient converted to NSR @ 1206    Hospital Course with Pertinent Findings:      7/10/2024 - Admitted to hospital medicine unit for A-fib w/ RVR  7/11/2024 - Upgraded to ICU level of care d/t refractory A-fib w/ RVR, requiring amiodarone drip. HR responded to esmolol, however BP did not tolerate continued dosing and it was stopped. Received digoxin 500mcg 7/12 without noticeable improvement in HR.   7/12 - some improvement with esmolol drip, BP unable to tolerate. No objective improvement with digoxin. Continues to be on amio drip.  7/13 - patient converted to NSR @ 1206    Discharge physical exam:  Vitals  BP: 117/80  Temp: 98.3 °F (36.8 °C)  Temp Source: Oral  Pulse: 67  Resp: 20  SpO2: 99 %  Height: 6' 3" (190.5 cm)  Weight: 115.3 kg (254 lb 3.1 oz) (bed wt on rounds)    Vitals reviewed.   Constitutional:       General: He is not in acute distress.  HENT:      Mouth/Throat:      Mouth: Mucous membranes are moist.   Eyes:      Conjunctiva/sclera: Conjunctivae normal.      Pupils: Pupils are equal, round, and reactive to light.   Cardiovascular:      Rate and Rhythm: Normal rate and regular rhythm.      Pulses: Normal pulses.      Heart sounds: Normal heart sounds.   Pulmonary:      Effort: Pulmonary effort is normal. No respiratory distress.      Breath sounds: Normal breath sounds.   Abdominal:      General: There is no distension.      Palpations: Abdomen is soft.   Musculoskeletal:         General: No tenderness.   Skin:     General: Skin is warm and dry.      Capillary Refill: Capillary refill takes less than 2 seconds. "   Neurological:      General: No focal deficit present.      Mental Status: He is alert.     TIME SPENT ON DISCHARGE: 35 minutes    Discharge Medications:         Medication List        START taking these medications      amiodarone 400 MG tablet  Commonly known as: PACERONE  Take 1 tablet (400 mg total) by mouth once daily.  Start taking on: July 17, 2024     apixaban 5 mg Tab  Commonly known as: ELIQUIS  Take 1 tablet (5 mg total) by mouth 2 (two) times daily.     hydrOXYzine pamoate 25 MG Cap  Commonly known as: VISTARIL  Take 1 capsule (25 mg total) by mouth every 8 (eight) hours as needed.     metoprolol succinate 50 MG 24 hr tablet  Commonly known as: TOPROL-XL  Take 1 tablet (50 mg total) by mouth 2 (two) times daily.            STOP taking these medications      diclofenac 75 MG EC tablet  Commonly known as: VOLTAREN               Where to Get Your Medications        These medications were sent to [facility]/pharmacy #[id number] - [place], [place] - [address]  [address] [address]      Phone: [phone]   amiodarone 400 MG tablet  apixaban 5 mg Tab  hydrOXYzine pamoate 25 MG Cap  metoprolol succinate 50 MG 24 hr tablet         Discharge Instructions:         Thad Payne Jr. is being discharged .    No discharge procedures on file.     Follow-Up Appointments:   Follow-up Information       Alvaro Padilla MD. Schedule an appointment as soon as possible for a visit in 1 week(s).    Specialty: Cardiology  Why: Cardiology follow up  Contact information:  [address] [place] [address] [phone]               Paulino Rosa Sr., MD. Schedule an appointment as soon as possible for a visit in 1 week(s).    Specialty: Internal Medicine  Why: Hospital follow up  Contact information:  [address] [facility] [address] [phone]                               To address at follow-up:  - patient to be discharged on Eliquis, amiodarone, metoprolol succinate  -follow-up with  cardiology clinic outpatient for further management of new onset AFib, patient will likely need to be switched off of amiodarone, we will defer to Cardiology    At this time, Thad Payne Jr. is determined to have maximized benefits of IP hospitalization. he is discharged in stable condition with OP f/u recommendations and instructions. All questions answered, and patient verbalized agreement with the POC. They were given return precautions prior to d/c including symptoms that should prompt return to ED or to call PCP. Total time spent of DC of 35 minutes.       [unfilled]

## 2024-07-18 ENCOUNTER — PATIENT MESSAGE (OUTPATIENT)
Dept: ADMINISTRATIVE | Facility: OTHER | Age: 44
End: 2024-07-18

## 2024-07-18 ENCOUNTER — PATIENT MESSAGE (OUTPATIENT)
Dept: ADMINISTRATIVE | Facility: CLINIC | Age: 44
End: 2024-07-18

## 2024-07-23 DIAGNOSIS — I48.91 NEW ONSET A-FIB: Primary | ICD-10-CM

## 2024-07-25 ENCOUNTER — OFFICE VISIT (OUTPATIENT)
Dept: CARDIOLOGY | Facility: CLINIC | Age: 44
End: 2024-07-25

## 2024-07-25 VITALS
BODY MASS INDEX: 31.5 KG/M2 | DIASTOLIC BLOOD PRESSURE: 76 MMHG | OXYGEN SATURATION: 100 % | TEMPERATURE: 99 F | WEIGHT: 253.38 LBS | HEIGHT: 75 IN | RESPIRATION RATE: 20 BRPM | HEART RATE: 57 BPM | SYSTOLIC BLOOD PRESSURE: 113 MMHG

## 2024-07-25 DIAGNOSIS — I48.91 ATRIAL FIBRILLATION WITH RVR: Primary | ICD-10-CM

## 2024-07-25 DIAGNOSIS — E78.5 HYPERLIPIDEMIA, UNSPECIFIED HYPERLIPIDEMIA TYPE: ICD-10-CM

## 2024-07-25 DIAGNOSIS — I50.20 HFREF (HEART FAILURE WITH REDUCED EJECTION FRACTION): ICD-10-CM

## 2024-07-25 DIAGNOSIS — I48.91 NEW ONSET A-FIB: ICD-10-CM

## 2024-07-25 PROCEDURE — 99214 OFFICE O/P EST MOD 30 MIN: CPT | Mod: PBBFAC | Performed by: INTERNAL MEDICINE

## 2024-07-25 RX ORDER — LISINOPRIL 5 MG/1
5 TABLET ORAL DAILY
Qty: 90 TABLET | Refills: 3 | Status: SHIPPED | OUTPATIENT
Start: 2024-07-25 | End: 2025-07-25

## 2024-07-25 NOTE — PROGRESS NOTES
[facility]  CLINIC NOTE    Patient's Name: Thad Payne Jr.  : 1980  MRN: [id number]  Date: 2024     Chief Complaint  Chief Complaint   Patient presents with    initial visit denies chest pain or sob since discharge no q       SUBJECTIVE   HPI:  Thad Payne Jr. is a 43 y.o. male with a PMHx of Afib with RVR, nicotine dependence, and class I obesity who presents to [facility] for a new patient appointment for new onset Afib with RVR.    Per chart review, patient presented to the ED for palpitations that was aggravated by physical activities and partially relieved by rest. He smokes 1 ppd since age 18 and drinks occasional alcohol (2-4 drinks/day). In the ED, he was found to be in Afib with RVR so he was given diltiazem 10, followed by 20 mg, and Lopressor 5 mg with response. EKG showed Afib without any ischemia. However, he was upgraded to the ICU in  for requirement of diltiazem drip. However, he converted.    Today, he reports that he's doing well. He denies any chest pain, palpitations, shortness of breath, nausea, vomiting, and swelling since being discharged from the hospital. He reports that he quit smoking and reduced drinking to 1 glass of wine every other day. He also endorses that he's trying to exercise more and plans to buy some weights.    Prior Cardio Workup:  EKG 2024:  Atrial fibrillation with  Aberrantly conducted supraventricular beats with   rapid ventricular response   Abnormal ECG   When compared with ECG of 10-JUL-2024 12:36,   No significant change was found     Echo (2024)  - Afib observed  - Moderately reduced systolic function with LVEF 35-40%    Review of Systems   Constitutional:  Negative for chills and fever.   Respiratory:  Negative for shortness of breath.    Cardiovascular:  Negative for chest pain, palpitations, orthopnea and leg swelling.   Gastrointestinal:  Negative for abdominal  pain, nausea and vomiting.   Neurological:  Negative for weakness.       OBJECTIVE     Vitals:    07/25/24 1433   BP: 113/76   Pulse: (!) 57   Resp: 20   Temp: 98.8 °F (37.1 °C)     Body mass index is 31.67 kg/m².  Physical Exam  Vitals reviewed.   Constitutional:       General: He is awake. He is not in acute distress.     Appearance: Normal appearance. He is well-developed. He is obese.   HENT:      Head: Normocephalic and atraumatic.   Eyes:      Extraocular Movements: Extraocular movements intact.      Conjunctiva/sclera: Conjunctivae normal.   Cardiovascular:      Rate and Rhythm: Regular rhythm. Bradycardia present.      Pulses: Normal pulses.      Heart sounds: Normal heart sounds. No murmur heard.  Pulmonary:      Effort: Pulmonary effort is normal. No accessory muscle usage or respiratory distress.      Breath sounds: Normal breath sounds.   Abdominal:      General: Abdomen is flat.      Palpations: Abdomen is soft.   Musculoskeletal:         General: No swelling or tenderness.   Skin:     General: Skin is warm and dry.   Neurological:      General: No focal deficit present.      Mental Status: He is alert and oriented to person, place, and time.   Psychiatric:         Behavior: Behavior normal. Behavior is cooperative.         Labs:  CBC:  Lab Results   Component Value Date    WBC 6.53 07/16/2024    HGB 14.7 07/16/2024    HCT 45.0 07/16/2024     07/16/2024    MCV 92.2 07/16/2024    RDW 13.6 07/16/2024     BMP/CMP:  Lab Results   Component Value Date     07/16/2024    K 4.4 07/16/2024     07/16/2024    CO2 25 07/16/2024    BUN 8.6 (L) 07/16/2024    CREATININE 1.11 07/16/2024    GLUCOSE 113 (H) 07/16/2024    EGFRNORACEVR >60 07/16/2024     RFTs/LFTs:  Lab Results   Component Value Date    CALCIUM 9.8 07/16/2024    LABPROT 6.7 07/16/2024    ALBUMIN 3.3 (L) 07/16/2024    AST 31 07/16/2024    ALT 66 (H) 07/16/2024    ALKPHOS 56 07/16/2024    BILITOT 0.5 07/16/2024    BILIDIR 0.1 04/24/2020     "IBILI 0.4 04/24/2020     Lipid Panel:  Lab Results   Component Value Date    CHOL 163 07/11/2024    HDL 34 (L) 07/11/2024    LDL 99.00 07/11/2024    TRIG 148 (H) 07/11/2024     Diabetes Panel:  Lab Results   Component Value Date    HGBA1C 5.7 07/12/2024     Thyroid Panel:  Lab Results   Component Value Date    TSH 1.230 07/10/2024     Anemia Panel:  No results found for: "IRON", "TIBC", "FERRITIN", "QGPOSFWD90", "FOLATE"  Urinalysis:  No results found for: "COLORUA", "APPEARANCEUA", "SGUA", "PHUA", "PROTEINUA", "GLUCOSEUA", "KETONESUA", "BLOODUA", "NITRITESUA", "LEUKOCYTESUR", "RBCUA", "WBCUA", "BACTERIA", "SQEPUA", "HYALINECASTS", "CREATRANDUR", "PROTEINURINE", "UPROTCREA"     Interval Imaging  Echo    Atrial fibrillation is observed.    Left Ventricle: The left ventricle is normal in size. Normal wall   thickness. There is moderately reduced systolic function with a visually   estimated ejection fraction of 35 - 40%.    Right Ventricle: Normal right ventricular cavity size. Systolic   function is reduced.    Left Atrium: Left atrium is mildly dilated.    Aortic Valve: The aortic valve is a trileaflet valve. There is no   stenosis. Aortic valve peak velocity is 1.14 m/s. Mean gradient is 3 mmHg.    Mitral Valve: The mitral valve is structurally normal. There is normal   leaflet mobility. There is mild regurgitation.    Tricuspid Valve: There is mild regurgitation.    Pulmonary Artery: The estimated pulmonary artery systolic pressure is   23 mmHg.    IVC/SVC: Normal venous pressure at 3 mmHg.       ASSESSMENT/PLAN   New-onset Afib with RVR  - Current home medications: amiodarone 400 mg daily, Eliquis 5 mg, metoprolol succinate 50 mg BID.  - Discontinued amiodarone 400 mg daily.  - CHADS-VASC score 1.  - Patient states that he would like to keep Eliquis for stroke prevention.    HFrEF (LVEF 35-40%)  - Last echo with LVEF 35-40% on 7/11/2024.  - Currently only on metoprolol succinate 50 mg BID (not on full GDMT).  - " Started on lisinopril 5 mg and uptitrate as tolerated.  - Repeat CBC and CMP in 2 weeks to monitor for hyperkalemia and kidney function.    Hyperlipidemia  - Last lipid panel significant for LDL 99.  - Counseled patient on lifestyle modifications, including limiting diet high in cholesterol and oils, such as salazar and fried foods, as well as increasing exercise up to 30 minutes per day for 5 days of the week.    Items to follow up at next clinic appointment:  - CBC/CMP in 2 weeks and lipid panel 1 week prior to next appointment.    Appointments:  - Follow up with [facility] Cardiology clinic in 4 months.       Case was discussed with Dr. Padilla. Please appreciate attending's attestation to follow.    Ralf Sierra MD  PGY-1 Resident  [facility] Internal Medicine  07/25/2024

## 2024-11-05 ENCOUNTER — PATIENT MESSAGE (OUTPATIENT)
Dept: RESEARCH | Facility: HOSPITAL | Age: 44
End: 2024-11-05

## 2024-12-16 RX ORDER — HYDROXYZINE PAMOATE 25 MG/1
25 CAPSULE ORAL 4 TIMES DAILY
COMMUNITY

## 2024-12-16 RX ORDER — HYDROXYZINE PAMOATE 25 MG/1
25 CAPSULE ORAL 4 TIMES DAILY
Qty: 90 CAPSULE | Refills: 0 | OUTPATIENT
Start: 2024-12-16

## 2025-02-17 ENCOUNTER — OFFICE VISIT (OUTPATIENT)
Dept: CARDIOLOGY | Facility: CLINIC | Age: 45
End: 2025-02-17

## 2025-02-17 ENCOUNTER — LAB VISIT (OUTPATIENT)
Dept: LAB | Facility: HOSPITAL | Age: 45
End: 2025-02-17

## 2025-02-17 VITALS
HEIGHT: 74 IN | SYSTOLIC BLOOD PRESSURE: 122 MMHG | WEIGHT: 254 LBS | DIASTOLIC BLOOD PRESSURE: 90 MMHG | RESPIRATION RATE: 16 BRPM | HEART RATE: 64 BPM | OXYGEN SATURATION: 100 % | BODY MASS INDEX: 32.6 KG/M2 | TEMPERATURE: 98 F

## 2025-02-17 DIAGNOSIS — I10 HYPERTENSION, UNSPECIFIED TYPE: ICD-10-CM

## 2025-02-17 DIAGNOSIS — I48.91 ATRIAL FIBRILLATION WITH RVR: ICD-10-CM

## 2025-02-17 DIAGNOSIS — I50.20 HFREF (HEART FAILURE WITH REDUCED EJECTION FRACTION): ICD-10-CM

## 2025-02-17 DIAGNOSIS — I50.20 HFREF (HEART FAILURE WITH REDUCED EJECTION FRACTION): Primary | ICD-10-CM

## 2025-02-17 DIAGNOSIS — E78.5 HYPERLIPIDEMIA, UNSPECIFIED HYPERLIPIDEMIA TYPE: ICD-10-CM

## 2025-02-17 DIAGNOSIS — I48.0 PAROXYSMAL ATRIAL FIBRILLATION: ICD-10-CM

## 2025-02-17 DIAGNOSIS — E78.1 PURE HYPERTRIGLYCERIDEMIA: ICD-10-CM

## 2025-02-17 LAB
ALBUMIN SERPL-MCNC: 3.9 G/DL (ref 3.5–5)
ALBUMIN/GLOB SERPL: 1 RATIO (ref 1.1–2)
ALP SERPL-CCNC: 67 UNIT/L (ref 40–150)
ALT SERPL-CCNC: 49 UNIT/L (ref 0–55)
ANION GAP SERPL CALC-SCNC: 5 MEQ/L
AST SERPL-CCNC: 28 UNIT/L (ref 5–34)
BASOPHILS # BLD AUTO: 0.03 X10(3)/MCL
BASOPHILS NFR BLD AUTO: 0.4 %
BILIRUB SERPL-MCNC: 0.3 MG/DL
BUN SERPL-MCNC: 13.1 MG/DL (ref 8.9–20.6)
CALCIUM SERPL-MCNC: 9.6 MG/DL (ref 8.4–10.2)
CHLORIDE SERPL-SCNC: 108 MMOL/L (ref 98–107)
CHOLEST SERPL-MCNC: 185 MG/DL
CHOLEST/HDLC SERPL: 4 {RATIO} (ref 0–5)
CO2 SERPL-SCNC: 25 MMOL/L (ref 22–29)
CREAT SERPL-MCNC: 0.9 MG/DL (ref 0.72–1.25)
CREAT/UREA NIT SERPL: 15
EOSINOPHIL # BLD AUTO: 0.12 X10(3)/MCL (ref 0–0.9)
EOSINOPHIL NFR BLD AUTO: 1.8 %
ERYTHROCYTE [DISTWIDTH] IN BLOOD BY AUTOMATED COUNT: 14.5 % (ref 11.5–17)
GFR SERPLBLD CREATININE-BSD FMLA CKD-EPI: >60 ML/MIN/1.73/M2
GLOBULIN SER-MCNC: 3.9 GM/DL (ref 2.4–3.5)
GLUCOSE SERPL-MCNC: 124 MG/DL (ref 74–100)
HCT VFR BLD AUTO: 48.1 % (ref 42–52)
HDLC SERPL-MCNC: 44 MG/DL (ref 35–60)
HGB BLD-MCNC: 15.3 G/DL (ref 14–18)
IMM GRANULOCYTES # BLD AUTO: 0.02 X10(3)/MCL (ref 0–0.04)
IMM GRANULOCYTES NFR BLD AUTO: 0.3 %
LDLC SERPL CALC-MCNC: 102 MG/DL (ref 50–140)
LYMPHOCYTES # BLD AUTO: 2.24 X10(3)/MCL (ref 0.6–4.6)
LYMPHOCYTES NFR BLD AUTO: 33.1 %
MCH RBC QN AUTO: 29.1 PG (ref 27–31)
MCHC RBC AUTO-ENTMCNC: 31.8 G/DL (ref 33–36)
MCV RBC AUTO: 91.6 FL (ref 80–94)
MONOCYTES # BLD AUTO: 0.62 X10(3)/MCL (ref 0.1–1.3)
MONOCYTES NFR BLD AUTO: 9.2 %
NEUTROPHILS # BLD AUTO: 3.74 X10(3)/MCL (ref 2.1–9.2)
NEUTROPHILS NFR BLD AUTO: 55.2 %
NRBC BLD AUTO-RTO: 0 %
PLATELET # BLD AUTO: 286 X10(3)/MCL (ref 130–400)
PMV BLD AUTO: 9.4 FL (ref 7.4–10.4)
POTASSIUM SERPL-SCNC: 4.4 MMOL/L (ref 3.5–5.1)
PROT SERPL-MCNC: 7.8 GM/DL (ref 6.4–8.3)
RBC # BLD AUTO: 5.25 X10(6)/MCL (ref 4.7–6.1)
SODIUM SERPL-SCNC: 138 MMOL/L (ref 136–145)
TRIGL SERPL-MCNC: 197 MG/DL (ref 34–140)
VLDLC SERPL CALC-MCNC: 39 MG/DL
WBC # BLD AUTO: 6.77 X10(3)/MCL (ref 4.5–11.5)

## 2025-02-17 PROCEDURE — 36415 COLL VENOUS BLD VENIPUNCTURE: CPT

## 2025-02-17 PROCEDURE — 80061 LIPID PANEL: CPT

## 2025-02-17 PROCEDURE — 99214 OFFICE O/P EST MOD 30 MIN: CPT | Mod: PBBFAC | Performed by: INTERNAL MEDICINE

## 2025-02-17 PROCEDURE — 85025 COMPLETE CBC W/AUTO DIFF WBC: CPT

## 2025-02-17 PROCEDURE — 80053 COMPREHEN METABOLIC PANEL: CPT

## 2025-02-17 RX ORDER — ASPIRIN 81 MG/1
81 TABLET ORAL DAILY
COMMUNITY
End: 2025-02-17

## 2025-02-17 RX ORDER — LISINOPRIL 10 MG/1
10 TABLET ORAL DAILY
Qty: 30 TABLET | Refills: 3 | Status: SHIPPED | OUTPATIENT
Start: 2025-02-17 | End: 2026-02-17

## 2025-02-17 NOTE — PROGRESS NOTES
Patient's Name: Thad Payne Jr.  : 1980  MRN: [id number]  Date: 2025     Chief Complaint  Chief Complaint   Patient presents with    4 month follow up with labs LV - 24. CBC AND CMP        SUBJECTIVE   HPI:  Thad Payne Jr. is a 44 y.o. male with hx of paroxysmal atrial fibrillation, cardiomyopathy (possibly tachycardia induced), previous smoking and class I obesity who presents to clinic for follow up.      Patient was initially admitted in 2024 with AFib with RVR meds noted to have reduced EF to 35-40%.  Patient reports AFib occurred after 2 days of heavy drinking.  Patient has been on Toprol 50 mg bid since.  He was initially on a DOAC but has stopped taking it due to cost.  Today he reports feeling well with no cardiovascular complaints.  He thinks he has been in sinus rhythm since converting in 2024.  He denies orthopnea, PND, lower extremity edema, chest pain, palpitations, dizziness, lightheadedness or any other complaints.  He has cut down significantly on alcohol and only drinks occasionally.  Moreover he is trying to watch his diet and eating less fried food.      Prior Cardio Workup:  EKG 2024:  Atrial fibrillation with  Aberrantly conducted supraventricular beats with   rapid ventricular response   Abnormal ECG   When compared with ECG of 10-JUL-2024 12:36,   No significant change was found     Results for orders placed during the hospital encounter of 07/10/24    Echo    Interpretation Summary    Atrial fibrillation is observed.    Left Ventricle: The left ventricle is normal in size. Normal wall thickness. There is moderately reduced systolic function with a visually estimated ejection fraction of 35 - 40%.    Right Ventricle: Normal right ventricular cavity size. Systolic function is reduced.    Left Atrium: Left atrium is mildly dilated.    Aortic Valve: The aortic valve is a trileaflet valve. There is no stenosis. Aortic valve peak velocity is 1.14 m/s. Mean  gradient is 3 mmHg.    Mitral Valve: The mitral valve is structurally normal. There is normal leaflet mobility. There is mild regurgitation.    Tricuspid Valve: There is mild regurgitation.    Pulmonary Artery: The estimated pulmonary artery systolic pressure is 23 mmHg.    IVC/SVC: Normal venous pressure at 3 mmHg.      Review of Systems   Constitutional:  Negative for chills and fever.   Respiratory:  Negative for shortness of breath.    Cardiovascular:  Negative for chest pain, palpitations, orthopnea and leg swelling.   Gastrointestinal:  Negative for abdominal pain, nausea and vomiting.   Neurological:  Negative for weakness.       OBJECTIVE     Vitals:    02/17/25 0916   BP: (!) 122/90   Pulse: 64   Resp: 16   Temp: 98.1 °F (36.7 °C)     Body mass index is 32.61 kg/m².  Physical Exam  Vitals reviewed.   Constitutional:       General: He is awake. He is not in acute distress.     Appearance: Normal appearance. He is well-developed. He is obese.   HENT:      Head: Normocephalic and atraumatic.   Eyes:      Extraocular Movements: Extraocular movements intact.      Conjunctiva/sclera: Conjunctivae normal.   Cardiovascular:      Rate and Rhythm: Regular rhythm. Bradycardia present.      Pulses: Normal pulses.      Heart sounds: Normal heart sounds. No murmur heard.  Pulmonary:      Effort: Pulmonary effort is normal. No accessory muscle usage or respiratory distress.      Breath sounds: Normal breath sounds.   Abdominal:      General: Abdomen is flat.      Palpations: Abdomen is soft.   Musculoskeletal:         General: No swelling or tenderness.   Skin:     General: Skin is warm and dry.   Neurological:      General: No focal deficit present.      Mental Status: He is alert and oriented to person, place, and time.   Psychiatric:         Behavior: Behavior normal. Behavior is cooperative.       Labs:  CBC:  Lab Results   Component Value Date    WBC 6.77 02/17/2025    HGB 15.3 02/17/2025    HCT 48.1 02/17/2025     " 02/17/2025    MCV 91.6 02/17/2025    RDW 14.5 02/17/2025     BMP/CMP:  Lab Results   Component Value Date     02/17/2025    K 4.4 02/17/2025     (H) 02/17/2025    CO2 25 02/17/2025    BUN 13.1 02/17/2025    CREATININE 0.90 02/17/2025    GLUCOSE 124 (H) 02/17/2025    EGFRNORACEVR >60 02/17/2025     RFTs/LFTs:  Lab Results   Component Value Date    CALCIUM 9.6 02/17/2025    LABPROT 7.8 02/17/2025    ALBUMIN 3.9 02/17/2025    AST 28 02/17/2025    ALT 49 02/17/2025    ALKPHOS 67 02/17/2025    BILITOT 0.3 02/17/2025    BILIDIR 0.1 04/24/2020    IBILI 0.4 04/24/2020     Lipid Panel:  Lab Results   Component Value Date    CHOL 185 02/17/2025    CHOL 163 07/11/2024    HDL 44 02/17/2025    HDL 34 (L) 07/11/2024    .00 02/17/2025    LDL 99.00 07/11/2024    TRIG 197 (H) 02/17/2025    TRIG 148 (H) 07/11/2024     Diabetes Panel:  Lab Results   Component Value Date    HGBA1C 5.7 07/12/2024     Thyroid Panel:  Lab Results   Component Value Date    TSH 1.230 07/10/2024     Anemia Panel:  No results found for: "IRON", "TIBC", "FERRITIN", "[id number]", "FOLATE"  Urinalysis:  No results found for: "COLORUA", "APPEARANCEUA", "SGUA", "PHUA", "PROTEINUA", "GLUCOSEUA", "KETONESUA", "BLOODUA", "NITRITESUA", "LEUKOCYTESUR", "RBCUA", "WBCUA", "BACTERIA", "SQEPUA", "HYALINECASTS", "CREATRANDUR", "PROTEINURINE", "UPROTCREA"     Interval Imaging  Echo    Atrial fibrillation is observed.    Left Ventricle: The left ventricle is normal in size. Normal wall   thickness. There is moderately reduced systolic function with a visually   estimated ejection fraction of 35 - 40%.    Right Ventricle: Normal right ventricular cavity size. Systolic   function is reduced.    Left Atrium: Left atrium is mildly dilated.    Aortic Valve: The aortic valve is a trileaflet valve. There is no   stenosis. Aortic valve peak velocity is 1.14 m/s. Mean gradient is 3 mmHg.    Mitral Valve: The mitral valve is structurally normal. There is " normal   leaflet mobility. There is mild regurgitation.    Tricuspid Valve: There is mild regurgitation.    Pulmonary Artery: The estimated pulmonary artery systolic pressure is   23 mmHg.    IVC/SVC: Normal venous pressure at 3 mmHg.       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  Will increase lisinopril to 10mg   Advised the pt to keep a BP log and contact us if BP too low    A1c 5.7% in July 2024  Elevated glucose on fasting labs  Elevated TG  Discussed lifestyle and diet changes and trying to lose 5-10% of body weight to prevent progression to DM and for better overall and CV health

## 2025-06-13 ENCOUNTER — HOSPITAL ENCOUNTER (OUTPATIENT)
Dept: CARDIOLOGY | Facility: HOSPITAL | Age: 45
Discharge: HOME | End: 2025-06-13
Attending: INTERNAL MEDICINE

## 2025-06-13 VITALS
DIASTOLIC BLOOD PRESSURE: 88 MMHG | BODY MASS INDEX: 32.6 KG/M2 | WEIGHT: 254 LBS | HEIGHT: 74 IN | SYSTOLIC BLOOD PRESSURE: 134 MMHG

## 2025-06-13 DIAGNOSIS — I50.20 HFREF (HEART FAILURE WITH REDUCED EJECTION FRACTION): ICD-10-CM

## 2025-06-13 LAB
APICAL FOUR CHAMBER EJECTION FRACTION: 62 %
APICAL TWO CHAMBER EJECTION FRACTION: 61 %
AV INDEX (PROSTH): 0.67
AV MEAN GRADIENT: 5 MMHG
AV PEAK GRADIENT: 9 MMHG
AV VALVE AREA BY VELOCITY RATIO: 2.1 CM²
AV VALVE AREA: 2.1 CM²
AV VELOCITY RATIO: 0.67
BSA FOR ECHO PROCEDURE: 2.45 M2
CV ECHO LV RWT: 0.47 CM
DOP CALC AO PEAK VEL: 1.5 M/S
DOP CALC AO VTI: 27.8 CM
DOP CALC LVOT AREA: 3.1 CM2
DOP CALC LVOT DIAMETER: 2 CM
DOP CALC LVOT PEAK VEL: 1 M/S
DOP CALC LVOT STROKE VOLUME: 58.4 CM3
DOP CALC MV VTI: 26.1 CM
DOP CALCLVOT PEAK VEL VTI: 18.6 CM
E WAVE DECELERATION TIME: 232 MSEC
E/A RATIO: 1.38
E/E' RATIO: 7 M/S
ECHO LV POSTERIOR WALL: 1.2 CM (ref 0.6–1.1)
FRACTIONAL SHORTENING: 37.3 % (ref 28–44)
HR MV ECHO: 70 BPM
INTERVENTRICULAR SEPTUM: 1.1 CM (ref 0.6–1.1)
LEFT ATRIUM AREA SYSTOLIC (APICAL 2 CHAMBER): 9.88 CM2
LEFT ATRIUM AREA SYSTOLIC (APICAL 4 CHAMBER): 14.77 CM2
LEFT ATRIUM SIZE: 3.8 CM
LEFT ATRIUM VOLUME INDEX MOD: 12 ML/M2
LEFT ATRIUM VOLUME MOD: 28 ML
LEFT INTERNAL DIMENSION IN SYSTOLE: 3.2 CM (ref 2.1–4)
LEFT VENTRICLE DIASTOLIC VOLUME INDEX: 51.04 ML/M2
LEFT VENTRICLE DIASTOLIC VOLUME: 123 ML
LEFT VENTRICLE END DIASTOLIC VOLUME APICAL 2 CHAMBER: 79.12 ML
LEFT VENTRICLE END DIASTOLIC VOLUME APICAL 4 CHAMBER: 102.18 ML
LEFT VENTRICLE END SYSTOLIC VOLUME APICAL 2 CHAMBER: 19.73 ML
LEFT VENTRICLE END SYSTOLIC VOLUME APICAL 4 CHAMBER: 32.06 ML
LEFT VENTRICLE MASS INDEX: 94.3 G/M2
LEFT VENTRICLE SYSTOLIC VOLUME INDEX: 16.2 ML/M2
LEFT VENTRICLE SYSTOLIC VOLUME: 39 ML
LEFT VENTRICULAR INTERNAL DIMENSION IN DIASTOLE: 5.1 CM (ref 3.5–6)
LEFT VENTRICULAR MASS: 227.4 G
LV LATERAL E/E' RATIO: 7.3 M/S
LV SEPTAL E/E' RATIO: 6.6 M/S
LVED V (TEICH): 123.15 ML
LVES V (TEICH): 39.48 ML
LVOT MG: 2.04 MMHG
LVOT MV: 0.66 CM/S
MV MEAN GRADIENT: 1 MMHG
MV PEAK A VEL: 0.48 M/S
MV PEAK E VEL: 0.66 M/S
MV PEAK GRADIENT: 3 MMHG
MV VALVE AREA BY CONTINUITY EQUATION: 2.24 CM2
OHS LV EJECTION FRACTION SIMPSONS BIPLANE MOD: 62 %
PISA MRMAX VEL: 5.67 M/S
PISA TR MAX VEL: 2.5 M/S
RA MAJOR: 4.22 CM
RA PRESSURE ESTIMATED: 0 MMHG
RV TB RVSP: 3 MMHG
TDI LATERAL: 0.09 M/S
TDI SEPTAL: 0.1 M/S
TDI: 0.1 M/S
TR MAX PG: 25 MMHG
TRICUSPID ANNULAR PLANE SYSTOLIC EXCURSION: 2.5 CM
TV REST PULMONARY ARTERY PRESSURE: 25 MMHG
Z-SCORE OF LEFT VENTRICULAR DIMENSION IN END DIASTOLE: -7.85
Z-SCORE OF LEFT VENTRICULAR DIMENSION IN END SYSTOLE: -5.83

## 2025-06-13 PROCEDURE — 93306 TTE W/DOPPLER COMPLETE: CPT

## 2025-06-15 ENCOUNTER — OFFICE VISIT (OUTPATIENT)
Dept: URGENT CARE | Facility: CLINIC | Age: 45
End: 2025-06-15
Payer: COMMERCIAL

## 2025-06-15 ENCOUNTER — RESULTS FOLLOW-UP (OUTPATIENT)
Dept: URGENT CARE | Facility: CLINIC | Age: 45
End: 2025-06-15

## 2025-06-15 VITALS
BODY MASS INDEX: 32.73 KG/M2 | HEIGHT: 74 IN | TEMPERATURE: 98 F | SYSTOLIC BLOOD PRESSURE: 145 MMHG | HEART RATE: 73 BPM | RESPIRATION RATE: 18 BRPM | DIASTOLIC BLOOD PRESSURE: 98 MMHG | OXYGEN SATURATION: 100 % | WEIGHT: 255 LBS

## 2025-06-15 DIAGNOSIS — M25.572 ACUTE LEFT ANKLE PAIN: ICD-10-CM

## 2025-06-15 DIAGNOSIS — S82.65XA CLOSED NONDISPLACED FRACTURE OF LATERAL MALLEOLUS OF LEFT FIBULA, INITIAL ENCOUNTER: Primary | ICD-10-CM

## 2025-06-15 PROCEDURE — 99203 OFFICE O/P NEW LOW 30 MIN: CPT | Mod: ,,, | Performed by: NURSE PRACTITIONER

## 2025-06-15 RX ORDER — HYDROCODONE BITARTRATE AND ACETAMINOPHEN 5; 325 MG/1; MG/1
1 TABLET ORAL EVERY 6 HOURS PRN
Qty: 12 TABLET | Refills: 0 | Status: SHIPPED | OUTPATIENT
Start: 2025-06-15 | End: 2025-06-18

## 2025-06-15 NOTE — PROGRESS NOTES
"Subjective:      Patient ID: Thad Payne Jr. is a 44 y.o. male.    Vitals:  height is 6' 2" (1.88 m) and weight is 115.7 kg (255 lb). His oral temperature is 98.4 °F (36.9 °C). His blood pressure is 145/98 (abnormal) and his pulse is 73. His respiration is 18 and oxygen saturation is 100%.     Chief Complaint: left ankle pain (Left ankle pain and swelling. Fell early this morning. Patient "stepped wrong". )    44-year-old  male presents to urgent care complaining of left lateral ankle pain secondary to slip and fall.  Patient denies any head trauma, neck trauma, or loss of consciousness.  Patient states he was walking to a fast food restaurant and slipped in a puddle of water and twisted his left ankle.  Steady gait        Constitution: Negative.   HENT: Negative.     Neck: neck negative.   Cardiovascular: Negative.    Eyes: Negative.    Respiratory: Negative.     Gastrointestinal: Negative.    Endocrine: negative.   Genitourinary: Negative.    Musculoskeletal:  Positive for pain, joint pain, joint swelling and pain with walking.   Skin: Negative.    Allergic/Immunologic: Negative.    Neurological: Negative.    Hematologic/Lymphatic: Negative.    Psychiatric/Behavioral: Negative.        Objective:     Physical Exam   Constitutional: He is oriented to person, place, and time. He appears well-developed. He is cooperative. He does not appear ill.   HENT:   Head: Normocephalic and atraumatic.   Ears:   Right Ear: Hearing, tympanic membrane, external ear and ear canal normal.   Left Ear: Hearing, tympanic membrane, external ear and ear canal normal.   Nose: Nose normal. No mucosal edema, rhinorrhea, nasal deformity or congestion. No epistaxis. Right sinus exhibits no maxillary sinus tenderness and no frontal sinus tenderness. Left sinus exhibits no maxillary sinus tenderness and no frontal sinus tenderness.   Mouth/Throat: Uvula is midline, oropharynx is clear and moist and mucous membranes are normal. " No trismus in the jaw. Normal dentition. No uvula swelling. No oropharyngeal exudate or posterior oropharyngeal erythema.   Eyes: Conjunctivae and lids are normal.   Neck: Trachea normal and phonation normal. Neck supple.   Cardiovascular: Normal rate, regular rhythm, normal heart sounds and normal pulses.   Pulmonary/Chest: Effort normal and breath sounds normal. No respiratory distress.   Abdominal: Normal appearance and bowel sounds are normal. Soft.   Musculoskeletal: Normal range of motion.         General: Tenderness present. Normal range of motion.      Left ankle: He exhibits swelling. Tenderness.        Legs:         Feet:    Lymphadenopathy:     He has no cervical adenopathy.   Neurological: no focal deficit. He is alert and oriented to person, place, and time. He exhibits normal muscle tone.   Skin: Skin is warm, dry and intact. Capillary refill takes less than 2 seconds.   Psychiatric: His speech is normal and behavior is normal. Judgment and thought content normal.   Nursing note and vitals reviewed.    EXAMINATION:  XR ANKLE COMPLETE 3 VIEW LEFT     CLINICAL HISTORY:  Pain in left ankle and joints of left foot     TECHNIQUE:  AP, lateral and oblique views of the left ankle     COMPARISON:  None     FINDINGS:  Oblique, minimally displaced fracture of the distal fibula.  No other acute fracture identified.  Joint alignments are maintained.     Impression:     Minimally displaced distal fibula fracture.        Electronically signed by:Rich Villafuerte  Date:                                            06/15/2025  Time:                                           12:26   Previous History      Review of patient's allergies indicates:  No Known Allergies    Past Medical History:   Diagnosis Date    Atrial fibrillation      Current Outpatient Medications   Medication Instructions    HYDROcodone-acetaminophen (NORCO) 5-325 mg per tablet 1 tablet, Oral, Every 6 hours PRN    hydrOXYzine pamoate (VISTARIL) 25 mg, 4  "times daily    lisinopriL 10 mg, Oral, Daily    metoprolol succinate (TOPROL-XL) 50 mg, Oral, 2 times daily     History reviewed. No pertinent surgical history.  Family History   Problem Relation Name Age of Onset    Hyperlipidemia Mother Marixa Payne     Hypertension Mother Marixa Payne     Hypertension Brother Santosh Payne        Social History[1]     Physical Exam      Vital Signs Reviewed   BP (!) 145/98 (BP Location: Right arm, Patient Position: Sitting)   Pulse 73   Temp 98.4 °F (36.9 °C) (Oral)   Resp 18   Ht 6' 2" (1.88 m)   Wt 115.7 kg (255 lb)   SpO2 100%   BMI 32.74 kg/m²        Procedures    Procedures     Labs     Results for orders placed or performed during the hospital encounter of 06/13/25   Echo    Collection Time: 06/13/25  2:41 PM   Result Value Ref Range    BSA 2.45 m2    Dangelo's Biplane MOD Ejection Fraction 62 %    A2C EF 61 %    A4C EF 62 %    LVOT stroke volume 58.4 cm3    LVIDd 5.1 3.5 - 6.0 cm    LV Systolic Volume 39 mL    LV Systolic Volume Index 16.2 mL/m2    LVIDs 3.2 2.1 - 4.0 cm    LV ESV A2C 19.73 mL    LV Diastolic Volume 123 mL    LV ESV A4C 32.06 mL    LV Diastolic Volume Index 51.04 mL/m2    LV EDV A2C 79.437517305561922 mL    LV EDV A4C 102.18 mL    Left Ventricular End Systolic Volume by Teichholz Method 39.48 mL    Left Ventricular End Diastolic Volume by Teichholz Method 123.15 mL    IVS 1.1 0.6 - 1.1 cm    LVOT diameter 2.0 cm    LVOT area 3.1 cm2    FS 37.3 28 - 44 %    Left Ventricle Relative Wall Thickness 0.47 cm    PW 1.2 (A) 0.6 - 1.1 cm    LV mass 227.4 g    LV Mass Index 94.3 g/m2    MV Peak E Jamie 0.66 m/s    TDI LATERAL 0.09 m/s    TDI SEPTAL 0.10 m/s    E/E' ratio 7 m/s    MV Peak A Jamie 0.48 m/s    TR Max Ajmie 2.5 m/s    E/A ratio 1.38     E wave deceleration time 232 msec    LV SEPTAL E/E' RATIO 6.6 m/s    LV LATERAL E/E' RATIO 7.3 m/s    LVOT peak jamie 1.0 m/s    Left Ventricular Outflow Tract Mean Velocity 0.66 cm/s    Left Ventricular Outflow Tract Mean " Gradient 2.04 mmHg    TAPSE 2.5 cm    LA size 3.8 cm    LA Vol (MOD) 28 mL    DESI (MOD) 12 mL/m2    RA Major Axis 4.22 cm    AV mean gradient 5 mmHg    AV peak gradient 9 mmHg    Ao peak bob 1.5 m/s    Ao VTI 27.8 cm    LVOT peak VTI 18.6 cm    AV valve area 2.1 cm²    AV Velocity Ratio 0.67     AV index (prosthetic) 0.67     ITALIA by Velocity Ratio 2.1 cm²    Mr max obb 5.67 m/s    MV mean gradient 1 mmHg    MV peak gradient 3 mmHg    MV valve area by continuity eq 2.24 cm2    MV VTI 26.1 cm    Triscuspid Valve Regurgitation Peak Gradient 25 mmHg    Mean e' 0.10 m/s    ZLVIDS -5.83     ZLVIDD -7.85     LA area A4C 14.77 cm2    LA area A2C 9.88 cm2    TV resting pulmonary artery pressure 25 mmHg    RV TB RVSP 3 mmHg    Est. RA pres 0 mmHg    Mitral Valve Heart Rate 70 bpm       Assessment:     1. Closed nondisplaced fracture of lateral malleolus of left fibula, initial encounter    2. Acute left ankle pain        Plan:   Take medications only as prescribed as they may cause sedation (safety precautions given).     Splint provided and applied by the clinic.    Loosen the splint if needed. Elevate extremity above the level of the heart while resting to avoid excessive swelling.     Get plenty of rest.    Follow-up with Orthopedics as directed.    Go to the Emergency Department with any significant change or worsening symptoms.      Closed nondisplaced fracture of lateral malleolus of left fibula, initial encounter  -     Ambulatory referral/consult to Orthopedics  -     AIR CAST WALKER BOOT FOR HOME USE  -     CRUTCHES FOR HOME USE  -     HYDROcodone-acetaminophen (NORCO) 5-325 mg per tablet; Take 1 tablet by mouth every 6 (six) hours as needed for Pain.  Dispense: 12 tablet; Refill: 0    Acute left ankle pain  -     X-Ray Ankle Complete Left; Future; Expected date: 06/15/2025  -     Ambulatory referral/consult to Orthopedics  -     AIR CAST WALKER BOOT FOR HOME USE  -     CRUTCHES FOR HOME USE                            [1]   Social History  Tobacco Use    Smoking status: Some Days     Current packs/day: 0.50     Types: Cigarettes    Smokeless tobacco: Never   Substance Use Topics    Alcohol use: Not Currently    Drug use: Not Currently

## 2025-06-15 NOTE — LETTER
Anitra 15, 2025      [facility]  [address], [address]  [place] [address]  Phone: [phone]  Fax: [phone]       Patient: Thad Payne   YOB: 1980  Date of Visit: 06/15/2025    To Whom It May Concern:    Bryant Payne  was at [facility] on 06/15/2025. The patient may return to work/school after being released by Orthopedics. If you have any questions or concerns, or if I can be of further assistance, please do not hesitate to contact me.    Sincerely,    Matt Pereyra MA

## 2025-06-15 NOTE — PATIENT INSTRUCTIONS
Take medications only as prescribed as they may cause sedation (safety precautions given).     Splint provided and applied by the clinic.    Loosen the splint if needed. Elevate extremity above the level of the heart while resting to avoid excessive swelling.     Get plenty of rest.    Follow-up with Orthopedics as directed.    Go to the Emergency Department with any significant change or worsening symptoms.

## 2025-06-16 ENCOUNTER — OFFICE VISIT (OUTPATIENT)
Dept: ORTHOPEDICS | Facility: CLINIC | Age: 45
End: 2025-06-16
Payer: COMMERCIAL

## 2025-06-16 VITALS
DIASTOLIC BLOOD PRESSURE: 86 MMHG | BODY MASS INDEX: 32.73 KG/M2 | HEIGHT: 74 IN | HEART RATE: 97 BPM | WEIGHT: 255.06 LBS | SYSTOLIC BLOOD PRESSURE: 132 MMHG

## 2025-06-16 DIAGNOSIS — S82.65XA CLOSED NONDISPLACED FRACTURE OF LATERAL MALLEOLUS OF LEFT FIBULA, INITIAL ENCOUNTER: Primary | ICD-10-CM

## 2025-06-16 PROCEDURE — 1159F MED LIST DOCD IN RCRD: CPT | Mod: CPTII,,, | Performed by: PHYSICIAN ASSISTANT

## 2025-06-16 PROCEDURE — 3079F DIAST BP 80-89 MM HG: CPT | Mod: CPTII,,, | Performed by: PHYSICIAN ASSISTANT

## 2025-06-16 PROCEDURE — 4010F ACE/ARB THERAPY RXD/TAKEN: CPT | Mod: CPTII,,, | Performed by: PHYSICIAN ASSISTANT

## 2025-06-16 PROCEDURE — 99204 OFFICE O/P NEW MOD 45 MIN: CPT | Mod: ,,, | Performed by: PHYSICIAN ASSISTANT

## 2025-06-16 PROCEDURE — 3008F BODY MASS INDEX DOCD: CPT | Mod: CPTII,,, | Performed by: PHYSICIAN ASSISTANT

## 2025-06-16 PROCEDURE — 3075F SYST BP GE 130 - 139MM HG: CPT | Mod: CPTII,,, | Performed by: PHYSICIAN ASSISTANT

## 2025-06-16 PROCEDURE — 1160F RVW MEDS BY RX/DR IN RCRD: CPT | Mod: CPTII,,, | Performed by: PHYSICIAN ASSISTANT

## 2025-06-16 NOTE — LETTER
[facility]  [address] [place] [address]. [address]  [place], [address]  Phone: [phone]  Fax: [phone]    Name:Thad Payne JrBreanna  :1980   Date:2025     PATIENT IS UNABLE TO WORK AS OF: 2025    [x] Pending treatment.  [x] For approximately [_] Days [ 2 ] Weeks [_] Months pending treatment. Patient will be re-evaluated on 2025.  [] Pending diagnostic testing.  [] Pending surgical treatment.  [] For approximately  months (Post Surgery)        Thank you,   Naveen Newton PA-C

## 2025-06-16 NOTE — PROGRESS NOTES
Chief Complaint:   Chief Complaint   Patient presents with    Ankle Injury     L foot - DOI 06/15/2025 - states he was walking on grass. When he stepped on concrete he rolled his ankle. Reports with walking boot/crutches. States pain is tolerable.       History of present illness:    This is a 44 y.o. year old   History of Present Illness    CHIEF COMPLAINT:  - Left ankle fracture    HPI:  Thad presents with a left ankle fracture sustained the previous day at approximately 1:00 AM after slipping while wearing Crocs. XRs at urgent care confirmed the fracture. Pain occurs with pressure on the bone, but he denies pain on the lateral side of the ankle. He is currently using crutches and wearing a boot for support. He expresses concern about his ability to work as a , as the job typically requires standing for most of the day. He was about to start a new job and was scheduled to take a physical the following day, which now has to be postponed due to the injury. He denies any ligament damage as per the urgent care evaluation. He visited urgent care yesterday morning after the injury occurred.    IMAGING:  - XR Left Ankle: Non-displaced fracture of the ankle. The ankle joint is lined up properly, and there is a crack going through the bone.    WORK STATUS:  - He is employed as a   - Job requires standing for most of the time  - He was about to start a new job and was supposed to take a physical the next day  - Due to the ankle fracture, he will need to call in and may require paperwork for the new employer  - He will be on toe-touch weight bearing for at least two weeks, which will affect his ability to work          Current Outpatient Medications   Medication Sig    lisinopriL 10 MG tablet Take 1 tablet (10 mg total) by mouth once daily.    metoprolol succinate (TOPROL-XL) 50 MG 24 hr tablet Take 1 tablet (50 mg total) by mouth 2 (two) times daily.    HYDROcodone-acetaminophen (NORCO) 5-325 mg per  "tablet Take 1 tablet by mouth every 6 (six) hours as needed for Pain.    hydrOXYzine pamoate (VISTARIL) 25 MG Cap Take 25 mg by mouth 4 (four) times daily. (Patient not taking: Reported on 6/16/2025)     No current facility-administered medications for this visit.       Review of Systems:    Constitution:   Denies chills, fever, and sweats.  HENT:   Denies headaches or blurry vision.  Cardiovascular:  Denies chest pain or irregular heart beat.  Respiratory:   Denies cough or shortness of breath.  Gastrointestinal:  Denies abdominal pain, nausea, or vomiting.  Musculoskeletal:   Denies muscle cramps.  Neurological:   Denies dizziness or focal weakness.  Psychiatric/Behavior: Normal mental status.  Hematology/Lymph:  Denies bleeding problem or easy bruising/bleeding.  Skin:    Denies rash or suspicious lesions.    Examination:    Vital Signs:    Vitals:    06/16/25 0844   BP: 132/86   Pulse: 97   Weight: 115.7 kg (255 lb 1.2 oz)   Height: 6' 2" (1.88 m)       Body mass index is 32.75 kg/m².    Constitution:   Well-developed, well nourished patient in no acute distress.  Neurological:   Alert and oriented x 3 and cooperative to examination.     Psychiatric/Behavior: Normal mental status.  Respiratory:   No shortness of breath.  Eyes:    Extraoccular muscles intact  Skin:    No scars, rash or suspicious lesions.    Physical Exam:   Left ankle  No obvious deformity. Plantar flexion and dorsiflexion is 60 degrees and 30 degrees, respectively.  Negative squeeze test.  Positive lateral malleolus tenderness.  Negative medial malleolus tenderness.  Negative talofibular ligament tenderness.  Negative anterior draw and lateral tilt.  5/5 strength, normal skin appearance and palpable pulses distally.  Sensibility normal.  Edema and ecchymosis lateral aspect of the ankle    Imaging: X-rays reviewed from urgent Care  Distal fibula fracture in good alignment with no signs of displacement  Well-maintained mortise       Assessment: " Closed nondisplaced fracture of lateral malleolus of left fibula, initial encounter         Plan:    Assessment & Plan    RIGHT ANKLE INJURY:  - Reviewed X-ray of the ankle with the patient, showing a fracture.  - Recommend conservative management for stable ankle fracture.  - Potential need for surgery if fracture becomes displaced during healing process.  - Use crutches for the next 2 weeks.  - Maintain toe-touch weight bearing on the affected foot for 2 weeks.  - Use the provided boot for ~6 weeks total.  - Avoid putting full weight on the affected foot for at least 2 weeks.  - Ordered XR Ankle in 2 weeks to reassess fracture alignment.    DEPENDENCE ON ENABLING DEVICES:  - Use crutches for the next 2 weeks.  - Use the provided boot for ~6 weeks total.    CHANGE OF JOB:  - Work note provided for patient's new job.    FOLLOW-UP:  - Follow up in 2 weeks for repeat X-ray and reassessment.                This note was generated with the assistance of ambient listening technology. Verbal consent was obtained by the patient and accompanying visitor(s) for the recording of patient appointment to facilitate this note. I attest to having reviewed and edited the generated note for accuracy, though some syntax or spelling errors may persist. Please contact the author of this note for any clarification.       DISCLAIMER: This note may have been dictated using voice recognition software and may contain grammatical errors.     NOTE: Consult report sent to referring provider via [employer].

## 2025-06-30 ENCOUNTER — OFFICE VISIT (OUTPATIENT)
Dept: ORTHOPEDICS | Facility: CLINIC | Age: 45
End: 2025-06-30
Payer: COMMERCIAL

## 2025-06-30 ENCOUNTER — HOSPITAL ENCOUNTER (OUTPATIENT)
Dept: RADIOLOGY | Facility: CLINIC | Age: 45
Discharge: HOME | End: 2025-06-30
Attending: PHYSICIAN ASSISTANT
Payer: COMMERCIAL

## 2025-06-30 VITALS — WEIGHT: 255.06 LBS | HEIGHT: 74 IN | BODY MASS INDEX: 32.73 KG/M2

## 2025-06-30 DIAGNOSIS — S82.65XA CLOSED NONDISPLACED FRACTURE OF LATERAL MALLEOLUS OF LEFT FIBULA, INITIAL ENCOUNTER: Primary | ICD-10-CM

## 2025-06-30 DIAGNOSIS — S82.65XA CLOSED NONDISPLACED FRACTURE OF LATERAL MALLEOLUS OF LEFT FIBULA, INITIAL ENCOUNTER: ICD-10-CM

## 2025-06-30 PROCEDURE — 3008F BODY MASS INDEX DOCD: CPT | Mod: CPTII,,, | Performed by: PHYSICIAN ASSISTANT

## 2025-06-30 PROCEDURE — 99213 OFFICE O/P EST LOW 20 MIN: CPT | Mod: ,,, | Performed by: PHYSICIAN ASSISTANT

## 2025-06-30 PROCEDURE — 1159F MED LIST DOCD IN RCRD: CPT | Mod: CPTII,,, | Performed by: PHYSICIAN ASSISTANT

## 2025-06-30 PROCEDURE — 1160F RVW MEDS BY RX/DR IN RCRD: CPT | Mod: CPTII,,, | Performed by: PHYSICIAN ASSISTANT

## 2025-06-30 PROCEDURE — 4010F ACE/ARB THERAPY RXD/TAKEN: CPT | Mod: CPTII,,, | Performed by: PHYSICIAN ASSISTANT

## 2025-06-30 PROCEDURE — 73610 X-RAY EXAM OF ANKLE: CPT | Mod: LT,,, | Performed by: PHYSICIAN ASSISTANT

## 2025-06-30 NOTE — LETTER
[facility]  [address] [place] [address]. [address]  [place], [address]  Phone: [phone]  Fax: [phone]    Name:Thad Payne JrBreanna  :1980   Date:2025     PATIENT IS UNABLE TO WORK AS OF: 25  [X] Pending treatment.  [X] For approximately [_] Days [2] Weeks [_] Months. Patient to be re-evaluated on 25.  [_] Pending diagnostic testing.  [_] Pending surgical treatment.  [_] For approximately _ months (Post Surgery)      COMMENTS       Naveen Newton PA-C

## 2025-06-30 NOTE — PROGRESS NOTES
Chief Complaint:   Chief Complaint   Patient presents with    Left Ankle - Follow-up     F/u for Closed nondisplaced fx of Lateral mal of Lt Fib, nwb, ambulates with knee scooter, reports decrease in pain, presents in boot,        History of present illness:    This is a 44 y.o. year old   History of Present Illness    CHIEF COMPLAINT:  - Ankle injury follow-up    HPI:  Thad presents for follow-up regarding an ankle injury. His ankle is improving, and he has been minimizing weight-bearing as much as possible. There is residual pain and swelling in the ankle, with him reporting localized discomfort. However, he notes significant improvement compared to his previous condition. He has been using a boot and crutches for mobility assistance. The injury appears to have occurred at least two weeks prior to this visit, based on the mention of a previous XR. 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 He denies any new injury or twisting of the ankle since the initial injury. Boot and crutches: Used to protect the ankle and limit weight-bearing  Minimizing weight-bearing: He has been staying off the ankle as much as possible    IMAGING:  - XR Ankle: Fracture line becoming less visible compared to two weeks ago, indicating progress in healing. No movement or shifting of bones observed.    WORK STATUS:  - Currently unable to work due to ankle injury  - May return to work in two weeks if XRs show good healing  - Potential return with protective boot  - Possible workplace restrictions (e.g., requirement for steel-toed boots)          Current Outpatient Medications   Medication Sig    lisinopriL 10 MG tablet Take 1 tablet (10 mg total) by mouth once daily.    metoprolol succinate (TOPROL-XL) 50 MG 24 hr tablet Take 1 tablet (50 mg total) by mouth 2 (two) times daily.    hydrOXYzine pamoate (VISTARIL) 25 MG Cap Take 25 mg by mouth 4 (four) times daily. (Patient not taking: Reported on 2/17/2025)  "    No current facility-administered medications for this visit.       Review of Systems:    Constitution:   Denies chills, fever, and sweats.  HENT:   Denies headaches or blurry vision.  Cardiovascular:  Denies chest pain or irregular heart beat.  Respiratory:   Denies cough or shortness of breath.  Gastrointestinal:  Denies abdominal pain, nausea, or vomiting.  Musculoskeletal:   Denies muscle cramps.  Neurological:   Denies dizziness or focal weakness.  Psychiatric/Behavior: Normal mental status.  Hematology/Lymph:  Denies bleeding problem or easy bruising/bleeding.  Skin:    Denies rash or suspicious lesions.    Examination:    Vital Signs:    Vitals:    06/30/25 0830   Weight: 115.7 kg (255 lb 1.2 oz)   Height: 6' 2" (1.88 m)       Body mass index is 32.75 kg/m².    Constitution:   Well-developed, well nourished patient in no acute distress.  Neurological:   Alert and oriented x 3 and cooperative to examination.     Psychiatric/Behavior: Normal mental status.  Respiratory:   No shortness of breath.  Eyes:    Extraoccular muscles intact  Skin:    No scars, rash or suspicious lesions.    Physical Exam:   Left ankle  No obvious deformity. Plantar flexion and dorsiflexion is 60 degrees and 30 degrees, respectively.  Negative squeeze test.  Decreased lateral malleolus tenderness.  Negative medial malleolus tenderness.  Negative talofibular ligament tenderness.  Negative anterior draw and lateral tilt.  5/5 strength, normal skin appearance and palpable pulses distally.  Sensibility normal.  Edema and ecchymosis lateral aspect of the ankle improving    Imaging: X-rays ordered and images interpreted today personally by me of left ankle three views   Patient has a lateral malleolus fracture in good position with no signs of displacement.    Patient has no displacement or migration from his previous x-rays.    Mortise as well intact        Assessment: Closed nondisplaced fracture of lateral malleolus of left fibula, " initial encounter  -     X-Ray Ankle Complete Left; Future; Expected date: 06/30/2025         Plan:    Assessment & Plan    ANKLE FRACTURE:  - Use boot when ambulating.  - Started putting some weight on the affected lower extremity while using the boot, using crutches for support.  - Increase weight-bearing gradually, backing off if pain increases.  - Wear sock for compression to help reduce swelling.  - Ordered XR in 2 weeks to assess bone healing progress.    DEPENDENCE ON MOBILITY DEVICES:  - Continue scooter for mobility outside the house if preferred.    FOLLOW-UP:  - Follow up in 2 weeks for repeat X-ray and reassessment.                This note was generated with the assistance of ambient listening technology. Verbal consent was obtained by the patient and accompanying visitor(s) for the recording of patient appointment to facilitate this note. I attest to having reviewed and edited the generated note for accuracy, though some syntax or spelling errors may persist. Please contact the author of this note for any clarification.       DISCLAIMER: This note may have been dictated using voice recognition software and may contain grammatical errors.     NOTE: Consult report sent to referring provider via [employer].

## 2025-07-09 ENCOUNTER — TELEPHONE (OUTPATIENT)
Dept: ORTHOPEDICS | Facility: CLINIC | Age: 45
End: 2025-07-09
Payer: COMMERCIAL

## 2025-07-09 NOTE — TELEPHONE ENCOUNTER
Attempted to call patient to r/s his appointment to a later morning. Patient did not answer. Left a brief message to callback when he can

## 2025-07-11 NOTE — TELEPHONE ENCOUNTER
Called and spoke with patient. He was ok with moving his appointment to a later time.     New appointment time has been set.    Detail Level: Detailed

## 2025-07-14 ENCOUNTER — OFFICE VISIT (OUTPATIENT)
Dept: ORTHOPEDICS | Facility: CLINIC | Age: 45
End: 2025-07-14
Payer: COMMERCIAL

## 2025-07-14 ENCOUNTER — HOSPITAL ENCOUNTER (OUTPATIENT)
Dept: RADIOLOGY | Facility: CLINIC | Age: 45
Discharge: HOME | End: 2025-07-14
Attending: PHYSICIAN ASSISTANT
Payer: COMMERCIAL

## 2025-07-14 DIAGNOSIS — S82.65XA CLOSED NONDISPLACED FRACTURE OF LATERAL MALLEOLUS OF LEFT FIBULA, INITIAL ENCOUNTER: Primary | ICD-10-CM

## 2025-07-14 DIAGNOSIS — S82.65XA CLOSED NONDISPLACED FRACTURE OF LATERAL MALLEOLUS OF LEFT FIBULA, INITIAL ENCOUNTER: ICD-10-CM

## 2025-07-14 PROCEDURE — 73610 X-RAY EXAM OF ANKLE: CPT | Mod: LT,,, | Performed by: PHYSICIAN ASSISTANT

## 2025-07-14 PROCEDURE — 4010F ACE/ARB THERAPY RXD/TAKEN: CPT | Mod: CPTII,,, | Performed by: PHYSICIAN ASSISTANT

## 2025-07-14 PROCEDURE — 1160F RVW MEDS BY RX/DR IN RCRD: CPT | Mod: CPTII,,, | Performed by: PHYSICIAN ASSISTANT

## 2025-07-14 PROCEDURE — 1159F MED LIST DOCD IN RCRD: CPT | Mod: CPTII,,, | Performed by: PHYSICIAN ASSISTANT

## 2025-07-14 PROCEDURE — 99213 OFFICE O/P EST LOW 20 MIN: CPT | Mod: ,,, | Performed by: PHYSICIAN ASSISTANT

## 2025-07-14 NOTE — LETTER
[facility]  [address] [place] [address]. [address]  [place], [address]  Phone: [phone]  Fax: [phone]    Name:Thad Payne JrBreanna  :1980   Date:2025     PATIENT IS UNABLE TO WORK AS OF: 2025   [x] Pending treatment.  [x] For approximately [_] Days [2] Weeks [_] Months   [] Pending diagnostic testing.  [] Pending surgical treatment.  [] For approximately  months (Post Surgery)      To whom it may concern,     Patient is to remain off work until his next repeat evaluation on 2025     If you have any questions or concerns, please do not hesitate to call our office.    Thank you,   Naveen Newton PA-C

## 2025-07-14 NOTE — PROGRESS NOTES
Chief Complaint:   Chief Complaint   Patient presents with    Follow-up     L ankle follow up - states it has been gradually getting better. States at times he is able to walk on it without the boot. Presents with walking boot/scooter        History of present illness:    This is a 44 y.o. year old   History of Present Illness    CHIEF COMPLAINT:  - Ankle fracture follow-up    HPI:  Thad presents for follow-up of an ankle fracture, approximately one month post-injury. His ankle has significantly improved, and he has been putting full weight on it with the boot as instructed, as well as walking without the boot. He reports no pain when testing the ankle by walking on it. He expresses uncertainty about returning to work and inquires about work status recommendations. He denies any shifting or movement of the fracture. Thad has been using a walking boot for approximately one month, with the ability to put full weight on the affected ankle while wearing the boot and walking without it at times.      WORK STATUS:  - Thad is currently unable to work due to an ankle injury  - Thad's job requires wearing steel-toed shoes  - Thad is concerned about safety on the work floor with the current boot  - I recommend staying out of work for at least two more weeks (total of 6 weeks since injury)  - After 6 weeks, I suggest using an aircast brace that would allow the patient to wear a regular steel-toed shoe and potentially return to work          Current Outpatient Medications   Medication Sig    lisinopriL 10 MG tablet Take 1 tablet (10 mg total) by mouth once daily.    metoprolol succinate (TOPROL-XL) 50 MG 24 hr tablet Take 1 tablet (50 mg total) by mouth 2 (two) times daily.    hydrOXYzine pamoate (VISTARIL) 25 MG Cap Take 25 mg by mouth 4 (four) times daily. (Patient not taking: Reported on 7/14/2025)     No current facility-administered medications for this visit.       Review of Systems:    Constitution:   Denies  chills, fever, and sweats.  HENT:   Denies headaches or blurry vision.  Cardiovascular:  Denies chest pain or irregular heart beat.  Respiratory:   Denies cough or shortness of breath.  Gastrointestinal:  Denies abdominal pain, nausea, or vomiting.  Musculoskeletal:   Denies muscle cramps.  Neurological:   Denies dizziness or focal weakness.  Psychiatric/Behavior: Normal mental status.  Hematology/Lymph:  Denies bleeding problem or easy bruising/bleeding.  Skin:    Denies rash or suspicious lesions.    Examination:    Vital Signs:  There were no vitals filed for this visit.    There is no height or weight on file to calculate BMI.    Constitution:   Well-developed, well nourished patient in no acute distress.  Neurological:   Alert and oriented x 3 and cooperative to examination.     Psychiatric/Behavior: Normal mental status.  Respiratory:   No shortness of breath.  Eyes:    Extraoccular muscles intact  Skin:    No scars, rash or suspicious lesions.    Physical Exam:   Left ankle  No obvious deformity. Plantar flexion and dorsiflexion is 60 degrees and 30 degrees, respectively.  Negative squeeze test.  Minimal lateral malleolus tenderness.  Negative medial malleolus tenderness.  Negative talofibular ligament tenderness.  Negative anterior draw and lateral tilt.  5/5 strength, normal skin appearance and palpable pulses distally.  Sensibility normal.  Edema and ecchymosis lateral aspect of the ankle improving    Imaging: X-rays ordered and images interpreted today personally by me of left ankle three views   Patient has well-maintained position of his lateral malleolus fracture with evidence of bony healing and callus formation.    There has been no shift in his ankle mortise        Assessment: Closed nondisplaced fracture of lateral malleolus of left fibula, initial encounter  -     X-Ray Ankle Complete Left; Future; Expected date: 07/14/2025         Plan:    Assessment & Plan    ANKLE FRACTURE:  - Reviewed X-ray  images of the ankle with the patient, showing the fracture line and discussing healing progress.  - Discussed potential use of an aircast brace in 2 weeks.  - Continue boot for at least 2 more weeks.  - Put full weight on ankle while using boot.  - Avoid activities that could cause re-injury for next 4-6 weeks.    DEPENDENCE ON MEDICAL DEVICES:  - Continue boot for at least 2 more weeks.  - Discussed potential use of an aircast brace in 2 weeks.    FOLLOW-UP:  - Follow up in 2 weeks for reassessment and potential transition to aircast brace.  - Work note provided for 2 more weeks off work.                This note was generated with the assistance of ambient listening technology. Verbal consent was obtained by the patient and accompanying visitor(s) for the recording of patient appointment to facilitate this note. I attest to having reviewed and edited the generated note for accuracy, though some syntax or spelling errors may persist. Please contact the author of this note for any clarification.       DISCLAIMER: This note may have been dictated using voice recognition software and may contain grammatical errors.     NOTE: Consult report sent to referring provider via [employer].

## 2025-07-28 ENCOUNTER — HOSPITAL ENCOUNTER (OUTPATIENT)
Dept: RADIOLOGY | Facility: CLINIC | Age: 45
Discharge: HOME | End: 2025-07-28
Attending: PHYSICIAN ASSISTANT
Payer: COMMERCIAL

## 2025-07-28 ENCOUNTER — OFFICE VISIT (OUTPATIENT)
Dept: ORTHOPEDICS | Facility: CLINIC | Age: 45
End: 2025-07-28
Payer: COMMERCIAL

## 2025-07-28 DIAGNOSIS — I48.0 PAROXYSMAL ATRIAL FIBRILLATION: Primary | ICD-10-CM

## 2025-07-28 DIAGNOSIS — S82.65XA CLOSED NONDISPLACED FRACTURE OF LATERAL MALLEOLUS OF LEFT FIBULA, INITIAL ENCOUNTER: ICD-10-CM

## 2025-07-28 PROCEDURE — 1159F MED LIST DOCD IN RCRD: CPT | Mod: CPTII,,, | Performed by: PHYSICIAN ASSISTANT

## 2025-07-28 PROCEDURE — 4010F ACE/ARB THERAPY RXD/TAKEN: CPT | Mod: CPTII,,, | Performed by: PHYSICIAN ASSISTANT

## 2025-07-28 PROCEDURE — 1160F RVW MEDS BY RX/DR IN RCRD: CPT | Mod: CPTII,,, | Performed by: PHYSICIAN ASSISTANT

## 2025-07-28 PROCEDURE — 73610 X-RAY EXAM OF ANKLE: CPT | Mod: LT,,, | Performed by: PHYSICIAN ASSISTANT

## 2025-07-28 PROCEDURE — 99213 OFFICE O/P EST LOW 20 MIN: CPT | Mod: ,,, | Performed by: PHYSICIAN ASSISTANT

## 2025-07-28 RX ORDER — METOPROLOL SUCCINATE 50 MG/1
50 TABLET, EXTENDED RELEASE ORAL 2 TIMES DAILY
Qty: 180 TABLET | Refills: 3 | Status: SHIPPED | OUTPATIENT
Start: 2025-07-28 | End: 2026-07-28

## 2025-07-28 NOTE — TELEPHONE ENCOUNTER
----- Message from [employer] sent at 7/28/2025  9:45 AM CDT -----  Regarding: New Rx Script  Lv: 2/17/25  Nv: 8/18/25  Provider: Dr. Diallo    Pt is requesting a new script for metoprolol succinate (TOPROL-XL) 50 MG 24 hr tablet.  He is completely out and is asking that the order be sent asap.    Pharm: [id number] - [place] - [address] [[id number]]  ##(NOT [facility])

## 2025-07-28 NOTE — TELEPHONE ENCOUNTER
----- Message from [employer] sent at 7/28/2025  9:45 AM CDT -----  Regarding: New Rx Script  Lv: 2/17/25  Nv: 8/18/25  Provider: Dr. Diallo    Pt is requesting a new script for metoprolol succinate (TOPROL-XL) 50 MG 24 hr tablet.  He is completely out and is asking that the order be sent asap.    Pharm: [id number] - [place] - [address] [[id number]]  ##(NOT [facility])

## 2025-07-28 NOTE — PROGRESS NOTES
Chief Complaint:   Chief Complaint   Patient presents with    Follow-up     L ankle follow up - states it doing better. He presents today with a walking boot.        History of present illness:    This is a 44 y.o. year old   History of Present Illness    CHIEF COMPLAINT:  - Ankle fracture follow-up    HPI:  Thad presents for follow-up 6 weeks after sustaining an ankle fracture. He reports attempting to walk without the boot, with pain still present but less intense. He has observed significant muscle loss in his leg. He mentions having to wrap the boot tightly around his leg to make it fit properly. He expresses concern about returning to work, as he was between jobs at the time of injury. He is contemplating how to manage his return to work given his current condition. He agrees to continue treatment for two more weeks to ensure complete recovery. 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 (Patient not taking: Reported on 7/14/2025)    lisinopriL 10 MG tablet Take 1 tablet (10 mg total) by mouth once daily.    metoprolol succinate (TOPROL-XL) 50 MG 24 hr tablet Take 1 tablet (50 mg total) by mouth 2 (two) times daily.     No current facility-administered medications for this visit.       Review of Systems:    Constitution:   Denies chills, fever, and sweats.  HENT:   Denies headaches or blurry vision.  Cardiovascular:  Denies chest pain or irregular heart beat.  Respiratory:   Denies cough or shortness of  breath.  Gastrointestinal:  Denies abdominal pain, nausea, or vomiting.  Musculoskeletal:   Denies muscle cramps.  Neurological:   Denies dizziness or focal weakness.  Psychiatric/Behavior: Normal mental status.  Hematology/Lymph:  Denies bleeding problem or easy bruising/bleeding.  Skin:    Denies rash or suspicious lesions.    Examination:    Vital Signs:  There were no vitals filed for this visit.    There is no height or weight on file to calculate BMI.    Constitution:   Well-developed, well nourished patient in no acute distress.  Neurological:   Alert and oriented x 3 and cooperative to examination.     Psychiatric/Behavior: Normal mental status.  Respiratory:   No shortness of breath.  Eyes:    Extraoccular muscles intact  Skin:    No scars, rash or suspicious lesions.    Physical Exam:   Left ankle  No obvious deformity. Plantar flexion and dorsiflexion is 60 degrees and 30 degrees, respectively.  Negative squeeze test.  Minimal lateral malleolus tenderness.  Negative medial malleolus tenderness.  Negative talofibular ligament tenderness.  Negative anterior draw and lateral tilt.  5/5 strength, normal skin appearance and palpable pulses distally.  Sensibility normal.  Edema and ecchymosis lateral aspect of the ankle improving    Imaging: X-rays ordered and images interpreted today personally by me of left ankle three views.    Patient has well-maintained lateral malleolus fracture in good position with a abundant healing noted.    No displacement no widening of the mortise        Assessment: Closed nondisplaced fracture of lateral malleolus of left fibula, initial encounter  -     X-Ray Ankle Complete Left; Future; Expected date: 07/28/2025         Plan:    Assessment & Plan    RIGHT ANKLE FRACTURE:  -the patient will remain in the boot and gradually get himself out of it in the house for short distances only.    We will see him back in 2 weeks take a repeat x-ray and we anticipate full release at that  point  FOLLOW-UP:  - Follow up in 2 weeks for full release evaluation.                This note was generated with the assistance of ambient listening technology. Verbal consent was obtained by the patient and accompanying visitor(s) for the recording of patient appointment to facilitate this note. I attest to having reviewed and edited the generated note for accuracy, though some syntax or spelling errors may persist. Please contact the author of this note for any clarification.       DISCLAIMER: This note may have been dictated using voice recognition software and may contain grammatical errors.     NOTE: Consult report sent to referring provider via [employer].

## 2025-07-28 NOTE — LETTER
[facility]  [address] [place] [address]. [address]  [place], [address]  Phone: [phone]  Fax: [phone]    Name:Thad Payne JrBreanna  :1980   Date:2025     PATIENT IS UNABLE TO WORK AS OF: 2025   [x] Pending treatment.  [x] For approximately [_] Days [2] Weeks [_] Months  [] Pending diagnostic testing.  [] Pending surgical treatment.    Patient will return to clinic 2025 for repeat evaluation. If you have any questions or concerns, please do not hesitate to call our office.    Thank you,   Naveen Newton PA-C

## 2025-08-11 ENCOUNTER — OFFICE VISIT (OUTPATIENT)
Dept: ORTHOPEDICS | Facility: CLINIC | Age: 45
End: 2025-08-11
Payer: COMMERCIAL

## 2025-08-11 ENCOUNTER — HOSPITAL ENCOUNTER (OUTPATIENT)
Dept: RADIOLOGY | Facility: CLINIC | Age: 45
Discharge: HOME | End: 2025-08-11
Attending: PHYSICIAN ASSISTANT
Payer: COMMERCIAL

## 2025-08-11 DIAGNOSIS — S82.65XA CLOSED NONDISPLACED FRACTURE OF LATERAL MALLEOLUS OF LEFT FIBULA, INITIAL ENCOUNTER: ICD-10-CM

## 2025-08-11 DIAGNOSIS — S82.65XA CLOSED NONDISPLACED FRACTURE OF LATERAL MALLEOLUS OF LEFT FIBULA, INITIAL ENCOUNTER: Primary | ICD-10-CM

## 2025-08-11 PROCEDURE — 1159F MED LIST DOCD IN RCRD: CPT | Mod: CPTII,,, | Performed by: PHYSICIAN ASSISTANT

## 2025-08-11 PROCEDURE — 4010F ACE/ARB THERAPY RXD/TAKEN: CPT | Mod: CPTII,,, | Performed by: PHYSICIAN ASSISTANT

## 2025-08-11 PROCEDURE — 1160F RVW MEDS BY RX/DR IN RCRD: CPT | Mod: CPTII,,, | Performed by: PHYSICIAN ASSISTANT

## 2025-08-11 PROCEDURE — 73610 X-RAY EXAM OF ANKLE: CPT | Mod: LT,,, | Performed by: PHYSICIAN ASSISTANT

## 2025-08-11 PROCEDURE — 99213 OFFICE O/P EST LOW 20 MIN: CPT | Mod: ,,, | Performed by: PHYSICIAN ASSISTANT

## 2025-08-11 RX ORDER — BUSPIRONE HYDROCHLORIDE 10 MG/1
10 TABLET ORAL 2 TIMES DAILY
COMMUNITY
Start: 2025-07-23

## 2025-08-12 DIAGNOSIS — A53.9 SYPHILIS: Primary | ICD-10-CM
